# Patient Record
Sex: FEMALE
[De-identification: names, ages, dates, MRNs, and addresses within clinical notes are randomized per-mention and may not be internally consistent; named-entity substitution may affect disease eponyms.]

---

## 2018-12-26 ENCOUNTER — TRANSCRIPTION ENCOUNTER (OUTPATIENT)
Age: 48
End: 2018-12-26

## 2019-12-09 ENCOUNTER — TRANSCRIPTION ENCOUNTER (OUTPATIENT)
Age: 49
End: 2019-12-09

## 2019-12-28 ENCOUNTER — TRANSCRIPTION ENCOUNTER (OUTPATIENT)
Age: 49
End: 2019-12-28

## 2022-02-24 ENCOUNTER — FORM ENCOUNTER (OUTPATIENT)
Age: 52
End: 2022-02-24

## 2022-02-25 ENCOUNTER — OUTPATIENT (OUTPATIENT)
Dept: OUTPATIENT SERVICES | Facility: HOSPITAL | Age: 52
LOS: 1 days | End: 2022-02-25
Payer: COMMERCIAL

## 2022-02-25 ENCOUNTER — APPOINTMENT (OUTPATIENT)
Dept: GYNECOLOGIC ONCOLOGY | Facility: CLINIC | Age: 52
End: 2022-02-25
Payer: COMMERCIAL

## 2022-02-25 ENCOUNTER — NON-APPOINTMENT (OUTPATIENT)
Age: 52
End: 2022-02-25

## 2022-02-25 VITALS
WEIGHT: 225 LBS | RESPIRATION RATE: 16 BRPM | HEIGHT: 65 IN | BODY MASS INDEX: 37.49 KG/M2 | DIASTOLIC BLOOD PRESSURE: 89 MMHG | SYSTOLIC BLOOD PRESSURE: 120 MMHG | HEART RATE: 66 BPM | OXYGEN SATURATION: 96 %

## 2022-02-25 VITALS
SYSTOLIC BLOOD PRESSURE: 110 MMHG | OXYGEN SATURATION: 97 % | HEART RATE: 71 BPM | HEIGHT: 66 IN | WEIGHT: 228.62 LBS | TEMPERATURE: 98 F | DIASTOLIC BLOOD PRESSURE: 86 MMHG | RESPIRATION RATE: 20 BRPM

## 2022-02-25 DIAGNOSIS — Z01.818 ENCOUNTER FOR OTHER PREPROCEDURAL EXAMINATION: ICD-10-CM

## 2022-02-25 DIAGNOSIS — Z91.89 OTHER SPECIFIED PERSONAL RISK FACTORS, NOT ELSEWHERE CLASSIFIED: ICD-10-CM

## 2022-02-25 DIAGNOSIS — F17.200 NICOTINE DEPENDENCE, UNSPECIFIED, UNCOMPLICATED: ICD-10-CM

## 2022-02-25 DIAGNOSIS — R19.00 INTRA-ABDOMINAL AND PELVIC SWELLING, MASS AND LUMP, UNSPECIFIED SITE: ICD-10-CM

## 2022-02-25 DIAGNOSIS — Z78.9 OTHER SPECIFIED HEALTH STATUS: ICD-10-CM

## 2022-02-25 DIAGNOSIS — Z86.39 PERSONAL HISTORY OF OTHER ENDOCRINE, NUTRITIONAL AND METABOLIC DISEASE: ICD-10-CM

## 2022-02-25 DIAGNOSIS — R10.2 PELVIC AND PERINEAL PAIN: ICD-10-CM

## 2022-02-25 DIAGNOSIS — Z63.5 DISRUPTION OF FAMILY BY SEPARATION AND DIVORCE: ICD-10-CM

## 2022-02-25 LAB
A1C WITH ESTIMATED AVERAGE GLUCOSE RESULT: 6 % — HIGH (ref 4–5.6)
ALBUMIN SERPL ELPH-MCNC: 4.6 G/DL — SIGNIFICANT CHANGE UP (ref 3.3–5.2)
ALP SERPL-CCNC: 69 U/L — SIGNIFICANT CHANGE UP (ref 40–120)
ALT FLD-CCNC: 9 U/L — SIGNIFICANT CHANGE UP
ANION GAP SERPL CALC-SCNC: 14 MMOL/L — SIGNIFICANT CHANGE UP (ref 5–17)
APPEARANCE UR: CLEAR — SIGNIFICANT CHANGE UP
APTT BLD: 31.3 SEC — SIGNIFICANT CHANGE UP (ref 27.5–35.5)
AST SERPL-CCNC: 19 U/L — SIGNIFICANT CHANGE UP
BACTERIA # UR AUTO: ABNORMAL
BASOPHILS # BLD AUTO: 0.03 K/UL — SIGNIFICANT CHANGE UP (ref 0–0.2)
BASOPHILS NFR BLD AUTO: 0.4 % — SIGNIFICANT CHANGE UP (ref 0–2)
BILIRUB SERPL-MCNC: 0.7 MG/DL — SIGNIFICANT CHANGE UP (ref 0.4–2)
BILIRUB UR-MCNC: NEGATIVE — SIGNIFICANT CHANGE UP
BLD GP AB SCN SERPL QL: SIGNIFICANT CHANGE UP
BUN SERPL-MCNC: 10.3 MG/DL — SIGNIFICANT CHANGE UP (ref 8–20)
CALCIUM SERPL-MCNC: 9.4 MG/DL — SIGNIFICANT CHANGE UP (ref 8.6–10.2)
CHLORIDE SERPL-SCNC: 104 MMOL/L — SIGNIFICANT CHANGE UP (ref 98–107)
CO2 SERPL-SCNC: 22 MMOL/L — SIGNIFICANT CHANGE UP (ref 22–29)
COLOR SPEC: YELLOW — SIGNIFICANT CHANGE UP
CREAT SERPL-MCNC: 0.88 MG/DL — SIGNIFICANT CHANGE UP (ref 0.5–1.3)
DIFF PNL FLD: ABNORMAL
EOSINOPHIL # BLD AUTO: 0.2 K/UL — SIGNIFICANT CHANGE UP (ref 0–0.5)
EOSINOPHIL NFR BLD AUTO: 2.4 % — SIGNIFICANT CHANGE UP (ref 0–6)
EPI CELLS # UR: SIGNIFICANT CHANGE UP
ESTIMATED AVERAGE GLUCOSE: 126 MG/DL — HIGH (ref 68–114)
GLUCOSE SERPL-MCNC: 94 MG/DL — SIGNIFICANT CHANGE UP (ref 70–99)
GLUCOSE UR QL: NEGATIVE MG/DL — SIGNIFICANT CHANGE UP
HCG SERPL-ACNC: <4 MIU/ML — SIGNIFICANT CHANGE UP
HCT VFR BLD CALC: 41.4 % — SIGNIFICANT CHANGE UP (ref 34.5–45)
HGB BLD-MCNC: 13.5 G/DL — SIGNIFICANT CHANGE UP (ref 11.5–15.5)
IMM GRANULOCYTES NFR BLD AUTO: 0.2 % — SIGNIFICANT CHANGE UP (ref 0–1.5)
INR BLD: 1.03 RATIO — SIGNIFICANT CHANGE UP (ref 0.88–1.16)
KETONES UR-MCNC: NEGATIVE — SIGNIFICANT CHANGE UP
LEUKOCYTE ESTERASE UR-ACNC: NEGATIVE — SIGNIFICANT CHANGE UP
LYMPHOCYTES # BLD AUTO: 3.06 K/UL — SIGNIFICANT CHANGE UP (ref 1–3.3)
LYMPHOCYTES # BLD AUTO: 37.3 % — SIGNIFICANT CHANGE UP (ref 13–44)
MCHC RBC-ENTMCNC: 30.2 PG — SIGNIFICANT CHANGE UP (ref 27–34)
MCHC RBC-ENTMCNC: 32.6 GM/DL — SIGNIFICANT CHANGE UP (ref 32–36)
MCV RBC AUTO: 92.6 FL — SIGNIFICANT CHANGE UP (ref 80–100)
MONOCYTES # BLD AUTO: 0.41 K/UL — SIGNIFICANT CHANGE UP (ref 0–0.9)
MONOCYTES NFR BLD AUTO: 5 % — SIGNIFICANT CHANGE UP (ref 2–14)
NEUTROPHILS # BLD AUTO: 4.49 K/UL — SIGNIFICANT CHANGE UP (ref 1.8–7.4)
NEUTROPHILS NFR BLD AUTO: 54.7 % — SIGNIFICANT CHANGE UP (ref 43–77)
NITRITE UR-MCNC: NEGATIVE — SIGNIFICANT CHANGE UP
PH UR: 5 — SIGNIFICANT CHANGE UP (ref 5–8)
PLATELET # BLD AUTO: 187 K/UL — SIGNIFICANT CHANGE UP (ref 150–400)
POTASSIUM SERPL-MCNC: 4.3 MMOL/L — SIGNIFICANT CHANGE UP (ref 3.5–5.3)
POTASSIUM SERPL-SCNC: 4.3 MMOL/L — SIGNIFICANT CHANGE UP (ref 3.5–5.3)
PROT SERPL-MCNC: 7.3 G/DL — SIGNIFICANT CHANGE UP (ref 6.6–8.7)
PROT UR-MCNC: NEGATIVE — SIGNIFICANT CHANGE UP
PROTHROM AB SERPL-ACNC: 12 SEC — SIGNIFICANT CHANGE UP (ref 10.5–13.4)
RBC # BLD: 4.47 M/UL — SIGNIFICANT CHANGE UP (ref 3.8–5.2)
RBC # FLD: 13.8 % — SIGNIFICANT CHANGE UP (ref 10.3–14.5)
RBC CASTS # UR COMP ASSIST: SIGNIFICANT CHANGE UP /HPF (ref 0–4)
SARS-COV-2 RNA SPEC QL NAA+PROBE: SIGNIFICANT CHANGE UP
SODIUM SERPL-SCNC: 139 MMOL/L — SIGNIFICANT CHANGE UP (ref 135–145)
SP GR SPEC: 1.02 — SIGNIFICANT CHANGE UP (ref 1.01–1.02)
UROBILINOGEN FLD QL: NEGATIVE MG/DL — SIGNIFICANT CHANGE UP
WBC # BLD: 8.21 K/UL — SIGNIFICANT CHANGE UP (ref 3.8–10.5)
WBC # FLD AUTO: 8.21 K/UL — SIGNIFICANT CHANGE UP (ref 3.8–10.5)
WBC UR QL: SIGNIFICANT CHANGE UP /HPF (ref 0–5)

## 2022-02-25 PROCEDURE — 93005 ELECTROCARDIOGRAM TRACING: CPT

## 2022-02-25 PROCEDURE — 76857 US EXAM PELVIC LIMITED: CPT | Mod: 59

## 2022-02-25 PROCEDURE — 93010 ELECTROCARDIOGRAM REPORT: CPT

## 2022-02-25 PROCEDURE — 99204 OFFICE O/P NEW MOD 45 MIN: CPT | Mod: 25

## 2022-02-25 PROCEDURE — G0463: CPT

## 2022-02-25 PROCEDURE — 76830 TRANSVAGINAL US NON-OB: CPT | Mod: 59

## 2022-02-25 PROCEDURE — 93976 VASCULAR STUDY: CPT | Mod: 59

## 2022-02-25 RX ORDER — ATORVASTATIN CALCIUM 80 MG/1
TABLET, FILM COATED ORAL
Refills: 0 | Status: ACTIVE | COMMUNITY

## 2022-02-25 RX ORDER — CEFOTETAN DISODIUM 1 G
2 VIAL (EA) INJECTION ONCE
Refills: 0 | Status: DISCONTINUED | OUTPATIENT
Start: 2022-03-08 | End: 2022-03-10

## 2022-02-25 RX ORDER — METRONIDAZOLE 500 MG
500 TABLET ORAL ONCE
Refills: 0 | Status: DISCONTINUED | OUTPATIENT
Start: 2022-03-08 | End: 2022-03-10

## 2022-02-25 SDOH — SOCIAL STABILITY - SOCIAL INSECURITY: DISRUPTION OF FAMILY BY SEPARATION AND DIVORCE: Z63.5

## 2022-02-25 NOTE — H&P PST ADULT - HISTORY OF PRESENT ILLNESS
53 yo female with a history of abdominal pain which started monday , patient went to urgent care tues and did a CT scan and US. At that point she stated they found a mass. LMP 5 years ago   Patient went to see Dr. Rendon and was referred for surgical intervention   Patient having a TAHBSO  frozen section poss staging

## 2022-02-25 NOTE — H&P PST ADULT - PROBLEM SELECTOR PLAN 1
Patient went to see Dr. Rendon and was referred for surgical intervention   Patient having a TAHBSO  frozen section poss staging   Patient educated on surgical scrub, COVID testing, preadmission instructions, medical clearance and day of procedure medications, verbalizes understanding.   Patient having difficult time getting medical eval due to late surgical date

## 2022-02-25 NOTE — H&P PST ADULT - NSANTHOSAYNRD_GEN_A_CORE
No. MAHNAZ screening performed.  STOP BANG Legend: 0-2 = LOW Risk; 3-4 = INTERMEDIATE Risk; 5-8 = HIGH Risk

## 2022-02-26 LAB
CULTURE RESULTS: SIGNIFICANT CHANGE UP
SPECIMEN SOURCE: SIGNIFICANT CHANGE UP

## 2022-02-28 ENCOUNTER — TRANSCRIPTION ENCOUNTER (OUTPATIENT)
Age: 52
End: 2022-02-28

## 2022-03-01 NOTE — HISTORY OF PRESENT ILLNESS
[FreeTextEntry1] : 53 yo nulligravid female LMP 2017 referred by Dr Pineda for ovarian mass. Patient reports right sided abdominal/pelvic pain which started 2/21/22, she states it was originally a dull pain in the morning, which worsened throughout the day to the point of limiting her walking. She took 1000 mg Tylenol, which did not alleviate her pain. She slept on her L side and then when she woke up Tuesday morning her pain was worse and now 9/10 in severity and had radiated up to abdomen and umbilicus and was tender to palpation. She denies fever nausea/vomiting. She had presented to urgent care and had abdominal US and subsequent CT AP w/ w/o contrast 2/22/22 which revealed cystic mass extending through the pelvis to the level of the mid abdomen and measuring 15.1 x 11.0 x 20.1 cm, in the pelvic portion of the mass there are linear heterogeneous areas of enhancement and nodularity. Follow up US 2/24/22 with midline/right cystic pelvic mass measuring 20.6 x 10.9 x 11.7 cm, cluster of cystic areas seen within, blood flow noted within surrounding tissue. Today she feels pulsating pain which is not as severe and is not tender to palpation. \par \par Patient reports loss of 5 lbs since the holidays which she feels is abnormal as she has been eating unhealthy and should have gained weight, although she reports diminished appetite. \par \par Denies PMB, change in bowel/bladder habits. \par \par Lpap: 9/2021 WNL\par Timo; 7/2021\par Lcologuard WNL

## 2022-03-01 NOTE — END OF VISIT
[FreeTextEntry3] : Written by Estee Gonzalez PA-C, acting as a scribe for Dr. Hank Rendon.\par This note accurately reflects the work and decisions made by me.\par

## 2022-03-01 NOTE — CHIEF COMPLAINT
[FreeTextEntry1] : [facility]\par [place]\par [address]\[place] [address]\par

## 2022-03-01 NOTE — PHYSICAL EXAM
[Chaperone Present] : A chaperone was present in the examining room during all aspects of the physical examination [Abnormal] : Abdomen: Abnormal [Normal] : Bimanual Exam: Normal [FreeTextEntry1] : Estee Gonzalez PA-C [de-identified] : palpable mass extending up to abdomen [de-identified] : No CMT

## 2022-03-07 ENCOUNTER — FORM ENCOUNTER (OUTPATIENT)
Age: 52
End: 2022-03-07

## 2022-03-08 ENCOUNTER — TRANSCRIPTION ENCOUNTER (OUTPATIENT)
Age: 52
End: 2022-03-08

## 2022-03-08 ENCOUNTER — RESULT REVIEW (OUTPATIENT)
Age: 52
End: 2022-03-08

## 2022-03-08 ENCOUNTER — INPATIENT (INPATIENT)
Facility: HOSPITAL | Age: 52
LOS: 1 days | Discharge: HOME | DRG: 743 | End: 2022-03-10
Attending: OBSTETRICS & GYNECOLOGY | Admitting: OBSTETRICS & GYNECOLOGY
Payer: COMMERCIAL

## 2022-03-08 VITALS
WEIGHT: 228.62 LBS | SYSTOLIC BLOOD PRESSURE: 139 MMHG | TEMPERATURE: 100 F | OXYGEN SATURATION: 96 % | HEIGHT: 66 IN | HEART RATE: 79 BPM | DIASTOLIC BLOOD PRESSURE: 87 MMHG | RESPIRATION RATE: 16 BRPM

## 2022-03-08 DIAGNOSIS — R10.2 PELVIC AND PERINEAL PAIN: ICD-10-CM

## 2022-03-08 LAB
ABO RH CONFIRMATION: SIGNIFICANT CHANGE UP
GLUCOSE BLDC GLUCOMTR-MCNC: 104 MG/DL — HIGH (ref 70–99)
GLUCOSE BLDC GLUCOMTR-MCNC: 87 MG/DL — SIGNIFICANT CHANGE UP (ref 70–99)
GLUCOSE BLDC GLUCOMTR-MCNC: 91 MG/DL — SIGNIFICANT CHANGE UP (ref 70–99)

## 2022-03-08 PROCEDURE — 88112 CYTOPATH CELL ENHANCE TECH: CPT | Mod: 26

## 2022-03-08 PROCEDURE — 88305 TISSUE EXAM BY PATHOLOGIST: CPT | Mod: 26,59

## 2022-03-08 PROCEDURE — 88305 TISSUE EXAM BY PATHOLOGIST: CPT | Mod: 26

## 2022-03-08 PROCEDURE — 58150 TOTAL HYSTERECTOMY: CPT | Mod: 80

## 2022-03-08 PROCEDURE — 88307 TISSUE EXAM BY PATHOLOGIST: CPT | Mod: 26

## 2022-03-08 PROCEDURE — 88331 PATH CONSLTJ SURG 1 BLK 1SPC: CPT | Mod: 26

## 2022-03-08 PROCEDURE — 58150 TOTAL HYSTERECTOMY: CPT

## 2022-03-08 DEVICE — HEMOCLIP LG ORANGE 6 CARTRIDGE TITANIUM
Type: IMPLANTABLE DEVICE | Status: NON-FUNCTIONAL
Removed: 2022-03-08

## 2022-03-08 DEVICE — HEMOCLIP MED BLUE 6 CARTRIDGE TITANIUM
Type: IMPLANTABLE DEVICE | Status: NON-FUNCTIONAL
Removed: 2022-03-08

## 2022-03-08 RX ORDER — METOCLOPRAMIDE HCL 10 MG
10 TABLET ORAL EVERY 6 HOURS
Refills: 0 | Status: DISCONTINUED | OUTPATIENT
Start: 2022-03-08 | End: 2022-03-10

## 2022-03-08 RX ORDER — SODIUM CHLORIDE 9 MG/ML
3 INJECTION INTRAMUSCULAR; INTRAVENOUS; SUBCUTANEOUS EVERY 8 HOURS
Refills: 0 | Status: DISCONTINUED | OUTPATIENT
Start: 2022-03-08 | End: 2022-03-08

## 2022-03-08 RX ORDER — ACETAMINOPHEN 500 MG
975 TABLET ORAL ONCE
Refills: 0 | Status: COMPLETED | OUTPATIENT
Start: 2022-03-08 | End: 2022-03-08

## 2022-03-08 RX ORDER — ATORVASTATIN CALCIUM 80 MG/1
1 TABLET, FILM COATED ORAL
Qty: 0 | Refills: 0 | DISCHARGE

## 2022-03-08 RX ORDER — CEFAZOLIN SODIUM 1 G
2000 VIAL (EA) INJECTION ONCE
Refills: 0 | Status: COMPLETED | OUTPATIENT
Start: 2022-03-08 | End: 2022-03-08

## 2022-03-08 RX ORDER — ENOXAPARIN SODIUM 100 MG/ML
40 INJECTION SUBCUTANEOUS EVERY 24 HOURS
Refills: 0 | Status: DISCONTINUED | OUTPATIENT
Start: 2022-03-08 | End: 2022-03-10

## 2022-03-08 RX ORDER — HYDROMORPHONE HYDROCHLORIDE 2 MG/ML
30 INJECTION INTRAMUSCULAR; INTRAVENOUS; SUBCUTANEOUS
Refills: 0 | Status: DISCONTINUED | OUTPATIENT
Start: 2022-03-08 | End: 2022-03-09

## 2022-03-08 RX ORDER — CELECOXIB 200 MG/1
200 CAPSULE ORAL ONCE
Refills: 0 | Status: COMPLETED | OUTPATIENT
Start: 2022-03-08 | End: 2022-03-08

## 2022-03-08 RX ORDER — KETOROLAC TROMETHAMINE 30 MG/ML
30 SYRINGE (ML) INJECTION EVERY 6 HOURS
Refills: 0 | Status: DISCONTINUED | OUTPATIENT
Start: 2022-03-08 | End: 2022-03-09

## 2022-03-08 RX ORDER — NALOXONE HYDROCHLORIDE 4 MG/.1ML
0.1 SPRAY NASAL
Refills: 0 | Status: DISCONTINUED | OUTPATIENT
Start: 2022-03-08 | End: 2022-03-09

## 2022-03-08 RX ORDER — HYDROMORPHONE HYDROCHLORIDE 2 MG/ML
0.5 INJECTION INTRAMUSCULAR; INTRAVENOUS; SUBCUTANEOUS
Refills: 0 | Status: DISCONTINUED | OUTPATIENT
Start: 2022-03-08 | End: 2022-03-08

## 2022-03-08 RX ORDER — DEXTROSE MONOHYDRATE, SODIUM CHLORIDE, AND POTASSIUM CHLORIDE 50; .745; 4.5 G/1000ML; G/1000ML; G/1000ML
1000 INJECTION, SOLUTION INTRAVENOUS
Refills: 0 | Status: DISCONTINUED | OUTPATIENT
Start: 2022-03-08 | End: 2022-03-09

## 2022-03-08 RX ORDER — ONDANSETRON 8 MG/1
4 TABLET, FILM COATED ORAL EVERY 6 HOURS
Refills: 0 | Status: DISCONTINUED | OUTPATIENT
Start: 2022-03-08 | End: 2022-03-10

## 2022-03-08 RX ORDER — ACETAMINOPHEN 500 MG
975 TABLET ORAL EVERY 6 HOURS
Refills: 0 | Status: DISCONTINUED | OUTPATIENT
Start: 2022-03-08 | End: 2022-03-10

## 2022-03-08 RX ORDER — METRONIDAZOLE 500 MG
500 TABLET ORAL ONCE
Refills: 0 | Status: COMPLETED | OUTPATIENT
Start: 2022-03-08 | End: 2022-03-08

## 2022-03-08 RX ADMIN — ENOXAPARIN SODIUM 40 MILLIGRAM(S): 100 INJECTION SUBCUTANEOUS at 21:31

## 2022-03-08 RX ADMIN — Medication 100 MILLIGRAM(S): at 22:30

## 2022-03-08 RX ADMIN — Medication 30 MILLIGRAM(S): at 18:46

## 2022-03-08 RX ADMIN — DEXTROSE MONOHYDRATE, SODIUM CHLORIDE, AND POTASSIUM CHLORIDE 125 MILLILITER(S): 50; .745; 4.5 INJECTION, SOLUTION INTRAVENOUS at 21:31

## 2022-03-08 RX ADMIN — HYDROMORPHONE HYDROCHLORIDE 30 MILLILITER(S): 2 INJECTION INTRAMUSCULAR; INTRAVENOUS; SUBCUTANEOUS at 18:06

## 2022-03-08 RX ADMIN — ONDANSETRON 4 MILLIGRAM(S): 8 TABLET, FILM COATED ORAL at 17:00

## 2022-03-08 RX ADMIN — Medication 975 MILLIGRAM(S): at 21:31

## 2022-03-08 RX ADMIN — Medication 975 MILLIGRAM(S): at 11:38

## 2022-03-08 RX ADMIN — Medication 100 MILLIGRAM(S): at 21:31

## 2022-03-08 RX ADMIN — CELECOXIB 200 MILLIGRAM(S): 200 CAPSULE ORAL at 11:38

## 2022-03-08 RX ADMIN — HYDROMORPHONE HYDROCHLORIDE 30 MILLILITER(S): 2 INJECTION INTRAMUSCULAR; INTRAVENOUS; SUBCUTANEOUS at 16:42

## 2022-03-08 RX ADMIN — HYDROMORPHONE HYDROCHLORIDE 30 MILLILITER(S): 2 INJECTION INTRAMUSCULAR; INTRAVENOUS; SUBCUTANEOUS at 17:54

## 2022-03-08 NOTE — DISCHARGE NOTE PROVIDER - CARE PROVIDER_API CALL
Hank Rendon)  Gynecologic Oncology; Obstetrics and Gynecology  [address] [place] [address]  Phone: [phone]  Fax: [phone]  Follow Up Time:

## 2022-03-08 NOTE — BRIEF OPERATIVE NOTE - SPECIMENS
Right ovarian cystic mass, ovary, and fallopian tube for frozen section; pelvic washings for cytology; uterus, cervix,

## 2022-03-08 NOTE — ASU PREOP CHECKLIST - COMMENTS
"Chief Complaint   Patient presents with     Hypertension     Initial /80 (BP Location: Right arm, Patient Position: Chair, Cuff Size: Adult Large)  Pulse 68  Temp 98  F (36.7  C) (Oral)  Resp 16  Ht 6' 1\" (1.854 m)  Wt (!) 382 lb 6.4 oz (173.5 kg)  SpO2 98%  BMI 50.45 kg/m2 Estimated body mass index is 50.45 kg/(m^2) as calculated from the following:    Height as of this encounter: 6' 1\" (1.854 m).    Weight as of this encounter: 382 lb 6.4 oz (173.5 kg).  BP completed using cuff size large right arm.    Lisa Magill, CMA    " pt finished bowel prep 3/7/22   at 1600

## 2022-03-08 NOTE — DISCHARGE NOTE PROVIDER - HOSPITAL COURSE
53 y/o now POD#2 s/p CINDI BSO for large pelvic mass, uncomplicated intraoperative course. Patient was subsequently transferred to [facility] where she had an uncomplicated recovery. Post op labs, vital signs, and physical exams were within normal range and reassuring. At time of discharge pain was well controlled, she was ambulating and spontaneously voiding without difficulty, she was tolerating PO intake, and had no outstanding issues/concerns.

## 2022-03-08 NOTE — BRIEF OPERATIVE NOTE - OPERATION/FINDINGS
Enlarged right cystic mass, approximately 21cm. Sent for frozen section-- benign mucinous cyst.   Normal appearing appendix. Small atrophic uterus, normal appearing left fallopian tube and atrophic ovary. No observed extra ovarian disease.

## 2022-03-08 NOTE — ASU PREOP CHECKLIST - AS BP NONINV METHOD
electronic
Risks/benefits discussed with patient/surrogate/Vaccine Information Sheet (VIS) provided-VIS date: 8/15/19

## 2022-03-08 NOTE — DISCHARGE NOTE PROVIDER - NSDCFUADDAPPT_GEN_ALL_CORE_FT
A PA will call you in 1 week to check how you are feeling post operatively.   Follow-up with Dr. Rendon in two weeks to review pathology report.   Please follow-up with PA in one week for post-op visit/removal of sutures.  Follow-up with Dr. Rendon in two weeks to review pathology report.

## 2022-03-08 NOTE — CHART NOTE - NSCHARTNOTEFT_GEN_A_CORE
Pt seen at bedside postop  She complains of abdominal pain at her incision site  She also has some sleepiness when using her PCA  States she is "miserable" but that there is nothing that we can do to fix that  Per patient request told her that frozen section showed benign cyst and however this cannot confirm a dx and that Dr. Rendon would discuss pathology further with her in office  Dressing has some blood staining however is not saturated and is still dry    Vital Signs Last 24 Hrs  T(C): 37 (08 Mar 2022 22:00), Max: 37.5 (08 Mar 2022 11:20)  T(F): 98.6 (08 Mar 2022 22:00), Max: 99.5 (08 Mar 2022 11:20)  HR: 60 (08 Mar 2022 22:00) (60 - 79)  BP: 126/82 (08 Mar 2022 22:00) (126/82 - 161/98)  BP(mean): 98 (08 Mar 2022 17:15) (87 - 108)  RR: 18 (08 Mar 2022 22:00) (14 - 19)  SpO2: 98% (08 Mar 2022 22:00) (94% - 99%)    Stable postop from [facility] BSO  Added Reglan for nausea as pt states Zofran doesn't work for her  Recommended trying tylenol ontop of Toradol and PCA for pain Pt seen at bedside postop  She complains of abdominal pain at her incision site  She also has some sleepiness when using her PCA  States she is "miserable" but that there is nothing that we can do to fix that  Per patient request told her that frozen section showed benign cyst and however this cannot confirm a dx and that Dr. Rendon would discuss pathology further with her in office  Dressing has some blood staining however is not saturated and is still dry  Hoffman draining yellow urine    Vital Signs Last 24 Hrs  T(C): 37 (08 Mar 2022 22:00), Max: 37.5 (08 Mar 2022 11:20)  T(F): 98.6 (08 Mar 2022 22:00), Max: 99.5 (08 Mar 2022 11:20)  HR: 60 (08 Mar 2022 22:00) (60 - 79)  BP: 126/82 (08 Mar 2022 22:00) (126/82 - 161/98)  BP(mean): 98 (08 Mar 2022 17:15) (87 - 108)  RR: 18 (08 Mar 2022 22:00) (14 - 19)  SpO2: 98% (08 Mar 2022 22:00) (94% - 99%)    Stable postop from CINDI BSO  Added Reglan for nausea as pt states Zofran doesn't work for her  Recommended trying tylenol ontop of Toradol and PCA for pain

## 2022-03-08 NOTE — DISCHARGE NOTE PROVIDER - NSDCMRMEDTOKEN_GEN_ALL_CORE_FT
atorvastatin 10 mg oral tablet: 1 tab(s) orally once a day  naproxen 500 mg oral tablet: 1 tab(s) orally 2 times a day   oxycodone-acetaminophen 5 mg-325 mg oral tablet: 1 tab(s) orally every 6 hours, As Needed -for severe pain MDD:4 tablet   atorvastatin 10 mg oral tablet: 1 tab(s) orally once a day  naproxen 500 mg oral tablet: 1 tab(s) orally 2 times a day   ondansetron 8 mg oral tablet, disintegratin tab(s) orally every 6 hours   oxycodone-acetaminophen 5 mg-325 mg oral tablet: 1 tab(s) orally every 6 hours, As Needed -for severe pain MDD:4 tablet

## 2022-03-08 NOTE — DISCHARGE NOTE PROVIDER - NSDCFUADDINST_GEN_ALL_CORE_FT
Please contact your provider for any pain uncontrolled by medication, excessive bleeding or Fever>100.4  Please take aleve-1 tablet every 12 hours x 3 days, may take percocet as prescribed for breakthrough pain.    May walk and climb stairs as often as youd like, no vigorous activity, do not lift anything greater than 10lbs, nothing per vagina x 6 weeks, do not drive while on pain medication.

## 2022-03-09 LAB
ANION GAP SERPL CALC-SCNC: 10 MMOL/L — SIGNIFICANT CHANGE UP (ref 5–17)
BASOPHILS # BLD AUTO: 0 K/UL — SIGNIFICANT CHANGE UP (ref 0–0.2)
BASOPHILS NFR BLD AUTO: 0 % — SIGNIFICANT CHANGE UP (ref 0–2)
BUN SERPL-MCNC: 7.3 MG/DL — LOW (ref 8–20)
CALCIUM SERPL-MCNC: 8.3 MG/DL — LOW (ref 8.6–10.2)
CHLORIDE SERPL-SCNC: 105 MMOL/L — SIGNIFICANT CHANGE UP (ref 98–107)
CO2 SERPL-SCNC: 23 MMOL/L — SIGNIFICANT CHANGE UP (ref 22–29)
CREAT SERPL-MCNC: 0.75 MG/DL — SIGNIFICANT CHANGE UP (ref 0.5–1.3)
EGFR: 96 ML/MIN/1.73M2 — SIGNIFICANT CHANGE UP
EOSINOPHIL # BLD AUTO: 0 K/UL — SIGNIFICANT CHANGE UP (ref 0–0.5)
EOSINOPHIL NFR BLD AUTO: 0 % — SIGNIFICANT CHANGE UP (ref 0–6)
GLUCOSE SERPL-MCNC: 163 MG/DL — HIGH (ref 70–99)
HCT VFR BLD CALC: 37.9 % — SIGNIFICANT CHANGE UP (ref 34.5–45)
HGB BLD-MCNC: 12.3 G/DL — SIGNIFICANT CHANGE UP (ref 11.5–15.5)
IMM GRANULOCYTES NFR BLD AUTO: 0.3 % — SIGNIFICANT CHANGE UP (ref 0–1.5)
LYMPHOCYTES # BLD AUTO: 0.82 K/UL — LOW (ref 1–3.3)
LYMPHOCYTES # BLD AUTO: 10.4 % — LOW (ref 13–44)
MAGNESIUM SERPL-MCNC: 2.1 MG/DL — SIGNIFICANT CHANGE UP (ref 1.6–2.6)
MCHC RBC-ENTMCNC: 30.4 PG — SIGNIFICANT CHANGE UP (ref 27–34)
MCHC RBC-ENTMCNC: 32.5 GM/DL — SIGNIFICANT CHANGE UP (ref 32–36)
MCV RBC AUTO: 93.6 FL — SIGNIFICANT CHANGE UP (ref 80–100)
MONOCYTES # BLD AUTO: 0.52 K/UL — SIGNIFICANT CHANGE UP (ref 0–0.9)
MONOCYTES NFR BLD AUTO: 6.6 % — SIGNIFICANT CHANGE UP (ref 2–14)
NEUTROPHILS # BLD AUTO: 6.55 K/UL — SIGNIFICANT CHANGE UP (ref 1.8–7.4)
NEUTROPHILS NFR BLD AUTO: 82.7 % — HIGH (ref 43–77)
PHOSPHATE SERPL-MCNC: 2.7 MG/DL — SIGNIFICANT CHANGE UP (ref 2.4–4.7)
PLATELET # BLD AUTO: 175 K/UL — SIGNIFICANT CHANGE UP (ref 150–400)
POTASSIUM SERPL-MCNC: 4.6 MMOL/L — SIGNIFICANT CHANGE UP (ref 3.5–5.3)
POTASSIUM SERPL-SCNC: 4.6 MMOL/L — SIGNIFICANT CHANGE UP (ref 3.5–5.3)
RBC # BLD: 4.05 M/UL — SIGNIFICANT CHANGE UP (ref 3.8–5.2)
RBC # FLD: 13.8 % — SIGNIFICANT CHANGE UP (ref 10.3–14.5)
SODIUM SERPL-SCNC: 138 MMOL/L — SIGNIFICANT CHANGE UP (ref 135–145)
WBC # BLD: 7.91 K/UL — SIGNIFICANT CHANGE UP (ref 3.8–10.5)
WBC # FLD AUTO: 7.91 K/UL — SIGNIFICANT CHANGE UP (ref 3.8–10.5)

## 2022-03-09 RX ORDER — OXYCODONE HYDROCHLORIDE 5 MG/1
10 TABLET ORAL EVERY 4 HOURS
Refills: 0 | Status: DISCONTINUED | OUTPATIENT
Start: 2022-03-09 | End: 2022-03-10

## 2022-03-09 RX ORDER — OXYCODONE HYDROCHLORIDE 5 MG/1
5 TABLET ORAL EVERY 4 HOURS
Refills: 0 | Status: DISCONTINUED | OUTPATIENT
Start: 2022-03-09 | End: 2022-03-09

## 2022-03-09 RX ORDER — OXYCODONE HYDROCHLORIDE 5 MG/1
5 TABLET ORAL EVERY 4 HOURS
Refills: 0 | Status: DISCONTINUED | OUTPATIENT
Start: 2022-03-09 | End: 2022-03-10

## 2022-03-09 RX ORDER — SIMETHICONE 80 MG/1
80 TABLET, CHEWABLE ORAL EVERY 4 HOURS
Refills: 0 | Status: DISCONTINUED | OUTPATIENT
Start: 2022-03-09 | End: 2022-03-10

## 2022-03-09 RX ORDER — MAGNESIUM HYDROXIDE 400 MG/1
30 TABLET, CHEWABLE ORAL DAILY
Refills: 0 | Status: DISCONTINUED | OUTPATIENT
Start: 2022-03-09 | End: 2022-03-10

## 2022-03-09 RX ADMIN — Medication 30 MILLIGRAM(S): at 23:15

## 2022-03-09 RX ADMIN — Medication 30 MILLIGRAM(S): at 00:13

## 2022-03-09 RX ADMIN — Medication 30 MILLIGRAM(S): at 06:04

## 2022-03-09 RX ADMIN — Medication 30 MILLIGRAM(S): at 13:11

## 2022-03-09 RX ADMIN — Medication 975 MILLIGRAM(S): at 03:00

## 2022-03-09 RX ADMIN — Medication 975 MILLIGRAM(S): at 15:07

## 2022-03-09 RX ADMIN — Medication 975 MILLIGRAM(S): at 16:07

## 2022-03-09 RX ADMIN — Medication 30 MILLIGRAM(S): at 18:05

## 2022-03-09 RX ADMIN — Medication 975 MILLIGRAM(S): at 12:37

## 2022-03-09 RX ADMIN — SIMETHICONE 80 MILLIGRAM(S): 80 TABLET, CHEWABLE ORAL at 20:24

## 2022-03-09 RX ADMIN — Medication 30 MILLIGRAM(S): at 12:41

## 2022-03-09 RX ADMIN — ENOXAPARIN SODIUM 40 MILLIGRAM(S): 100 INJECTION SUBCUTANEOUS at 23:16

## 2022-03-09 RX ADMIN — Medication 975 MILLIGRAM(S): at 09:31

## 2022-03-09 RX ADMIN — Medication 975 MILLIGRAM(S): at 20:24

## 2022-03-09 NOTE — CHART NOTE - NSCHARTNOTEFT_GEN_A_CORE
Island dressing noted to be saturated after trip to restroom with gudelia-bottle   Incision clean dry and intact with staples, no active bleeding noted from the incision.   Abdominal pad placed over incision.

## 2022-03-09 NOTE — PROGRESS NOTE ADULT - ATTENDING COMMENTS
Patient is POD1 s/p CINDI BSO for large pelvic mass. Reports doing well this AM. Pain controlled; will transition from PCA to PO medications. Will dc ortiz and await first void. AM labs reviewed and WNL. Will continue inpatient management at this time. Discussed with Dr. Rendon.

## 2022-03-09 NOTE — PROGRESS NOTE ADULT - SUBJECTIVE AND OBJECTIVE BOX
GYNECOLOGIC ONCOLOGY PROGRESS NOTE    POD#1    PROBLEMS:  Organomegaly  At risk for venous thromboembolism (VTE)  Pelvic mass    SUBJECTIVE:  Patient is without major complaints.   Pain well-controlled on PCA pain regimen.   Flatus: not yet.   Tolerating PO intake, denies nausea/vomiting.   Hoffman removed 30 mins ago, no current urge to void.   Ambulated without major difficulty to bathroom.   Denies dizziness, lightheadedness, SOB, palpitations, or fatigue at rest or while ambulating.   Denies fevers, chills, malaise, fatigue, and myalgia.     OBJECTIVE:   VITALS:  T(F): 98.5 (03-09-22 @ 06:00), Max: 99.5 (03-08-22 @ 11:20)  HR: 60 (03-09-22 @ 06:00) (60 - 79)  BP: 113/77 (03-09-22 @ 06:00) (113/77 - 161/98)  RR: 16 (03-09-22 @ 06:00) (14 - 19)  SpO2: 97% (03-09-22 @ 06:00) (94% - 99%)    I&O's Summary  08 Mar 2022 07:01  -  09 Mar 2022 06:40  --------------------------------------------------------  IN: 1275 mL / OUT: 2361 mL / NET: -1086 mL    MEDICATIONS  (STANDING):  acetaminophen     Tablet .. 975 milliGRAM(s) Oral every 6 hours  cefoTEtan  IVPB 2 Gram(s) IV Intermittent once  dextrose 5% + sodium chloride 0.45% with potassium chloride 20 mEq/L 1000 milliLiter(s) (125 mL/Hr) IV Continuous <Continuous>  enoxaparin Injectable 40 milliGRAM(s) SubCutaneous every 24 hours  HYDROmorphone PCA (1 mG/mL) 30 milliLiter(s) PCA Continuous PCA Continuous  ketorolac   Injectable 30 milliGRAM(s) IV Push every 6 hours  metroNIDAZOLE  IVPB 500 milliGRAM(s) IV Intermittent once    MEDICATIONS  (PRN):  metoclopramide Injectable 10 milliGRAM(s) IV Push every 6 hours PRN nausea/vomiting  naloxone Injectable 0.1 milliGRAM(s) IV Push every 3 minutes PRN For ANY of the following changes in patient status:  A. RR LESS THAN 10 breaths per minute, B. Oxygen saturation LESS THAN 90%, C. Sedation score of 6  ondansetron Injectable 4 milliGRAM(s) IV Push every 6 hours PRN Nausea  ondansetron Injectable 4 milliGRAM(s) IV Push every 6 hours PRN Nausea and/or Vomiting    Physical Exam:  Constitutional: NAD  Pulmonary: clear to auscultation bilaterally   Cardiovascular: Regular rate and rhythm   Abdomen: soft, appropriately tender to palpation, non-distended, +bowel sounds  Extremities: no lower extremity edema or calve tenderness.  Incision: Clean, dry, intact; staples in place, island dressing in place     LABS:                        12.3   7.91  )-----------( 175      ( 09 Mar 2022 06:07 )             37.9     03-09    138  |  105  |  7.3<L>  ----------------------------<  163<H>  4.6   |  23.0  |  0.75    Ca    8.3<L>      09 Mar 2022 06:07  Phos  2.7     03-09  Mg     2.1     03-09     GYNECOLOGIC ONCOLOGY PROGRESS NOTE    POD#1    PROBLEMS:  Organomegaly  At risk for venous thromboembolism (VTE)  Pelvic mass    SUBJECTIVE:  Patient is without major complaints.   Pain well-controlled on PCA pain regimen.   Flatus: not yet.   Tolerating PO intake, denies nausea/vomiting.   Hoffman removed 30 mins ago, was able to void soon after.   Ambulated without major difficulty to bathroom.   Denies dizziness, lightheadedness, SOB, palpitations, or fatigue at rest or while ambulating.   Denies fevers, chills, malaise, fatigue, and myalgia.     OBJECTIVE:   VITALS:  T(F): 98.5 (03-09-22 @ 06:00), Max: 99.5 (03-08-22 @ 11:20)  HR: 60 (03-09-22 @ 06:00) (60 - 79)  BP: 113/77 (03-09-22 @ 06:00) (113/77 - 161/98)  RR: 16 (03-09-22 @ 06:00) (14 - 19)  SpO2: 97% (03-09-22 @ 06:00) (94% - 99%)    I&O's Summary  08 Mar 2022 07:01  -  09 Mar 2022 06:40  --------------------------------------------------------  IN: 1275 mL / OUT: 2361 mL / NET: -1086 mL    MEDICATIONS  (STANDING):  acetaminophen     Tablet .. 975 milliGRAM(s) Oral every 6 hours  cefoTEtan  IVPB 2 Gram(s) IV Intermittent once  dextrose 5% + sodium chloride 0.45% with potassium chloride 20 mEq/L 1000 milliLiter(s) (125 mL/Hr) IV Continuous <Continuous>  enoxaparin Injectable 40 milliGRAM(s) SubCutaneous every 24 hours  HYDROmorphone PCA (1 mG/mL) 30 milliLiter(s) PCA Continuous PCA Continuous  ketorolac   Injectable 30 milliGRAM(s) IV Push every 6 hours  metroNIDAZOLE  IVPB 500 milliGRAM(s) IV Intermittent once    MEDICATIONS  (PRN):  metoclopramide Injectable 10 milliGRAM(s) IV Push every 6 hours PRN nausea/vomiting  naloxone Injectable 0.1 milliGRAM(s) IV Push every 3 minutes PRN For ANY of the following changes in patient status:  A. RR LESS THAN 10 breaths per minute, B. Oxygen saturation LESS THAN 90%, C. Sedation score of 6  ondansetron Injectable 4 milliGRAM(s) IV Push every 6 hours PRN Nausea  ondansetron Injectable 4 milliGRAM(s) IV Push every 6 hours PRN Nausea and/or Vomiting    Physical Exam:  Constitutional: NAD  Pulmonary: clear to auscultation bilaterally   Cardiovascular: Regular rate and rhythm   Abdomen: soft, appropriately tender to palpation, non-distended, +bowel sounds  Extremities: no lower extremity edema or calve tenderness.  Incision: Clean, dry, intact; staples in place, island dressing in place     LABS:                        12.3   7.91  )-----------( 175      ( 09 Mar 2022 06:07 )             37.9     03-09    138  |  105  |  7.3<L>  ----------------------------<  163<H>  4.6   |  23.0  |  0.75    Ca    8.3<L>      09 Mar 2022 06:07  Phos  2.7     03-09  Mg     2.1     03-09     GYNECOLOGIC ONCOLOGY PROGRESS NOTE    POD#1    PROBLEMS:  Organomegaly  At risk for venous thromboembolism (VTE)  Pelvic mass    SUBJECTIVE:  Patient is without major complaints.   Pain well-controlled on PCA pain regimen.   Flatus: not yet.   Tolerating PO intake, denies nausea/vomiting.   Hoffman removed 30 mins ago, was able to void soon after.   Ambulated without major difficulty to bathroom.   Denies dizziness, lightheadedness, SOB, palpitations, or fatigue at rest or while ambulating.   Denies fevers, chills, malaise, fatigue, and myalgia.     OBJECTIVE:   VITALS:  T(F): 98.5 (03-09-22 @ 06:00), Max: 99.5 (03-08-22 @ 11:20)  HR: 60 (03-09-22 @ 06:00) (60 - 79)  BP: 113/77 (03-09-22 @ 06:00) (113/77 - 161/98)  RR: 16 (03-09-22 @ 06:00) (14 - 19)  SpO2: 97% (03-09-22 @ 06:00) (94% - 99%)    I&O's Summary  08 Mar 2022 07:01  -  09 Mar 2022 06:40  --------------------------------------------------------  IN: 1275 mL / OUT: 2361 mL / NET: -1086 mL    MEDICATIONS  (STANDING):  acetaminophen     Tablet .. 975 milliGRAM(s) Oral every 6 hours  cefoTEtan  IVPB 2 Gram(s) IV Intermittent once  dextrose 5% + sodium chloride 0.45% with potassium chloride 20 mEq/L 1000 milliLiter(s) (125 mL/Hr) IV Continuous <Continuous>  enoxaparin Injectable 40 milliGRAM(s) SubCutaneous every 24 hours  HYDROmorphone PCA (1 mG/mL) 30 milliLiter(s) PCA Continuous PCA Continuous  ketorolac   Injectable 30 milliGRAM(s) IV Push every 6 hours  metroNIDAZOLE  IVPB 500 milliGRAM(s) IV Intermittent once    MEDICATIONS  (PRN):  metoclopramide Injectable 10 milliGRAM(s) IV Push every 6 hours PRN nausea/vomiting  naloxone Injectable 0.1 milliGRAM(s) IV Push every 3 minutes PRN For ANY of the following changes in patient status:  A. RR LESS THAN 10 breaths per minute, B. Oxygen saturation LESS THAN 90%, C. Sedation score of 6  ondansetron Injectable 4 milliGRAM(s) IV Push every 6 hours PRN Nausea  ondansetron Injectable 4 milliGRAM(s) IV Push every 6 hours PRN Nausea and/or Vomiting    Physical Exam:  Constitutional: NAD  Pulmonary: clear to auscultation bilaterally   Cardiovascular: Regular rate and rhythm   Abdomen: soft, appropriately tender to palpation, non-distended, +bowel sounds  Extremities: no lower extremity edema or calve tenderness.  Incision: Clean, dry, intact; staples in place, island dressing in place -mildly saturated with dried blood.     LABS:                        12.3   7.91  )-----------( 175      ( 09 Mar 2022 06:07 )             37.9     03-09    138  |  105  |  7.3<L>  ----------------------------<  163<H>  4.6   |  23.0  |  0.75    Ca    8.3<L>      09 Mar 2022 06:07  Phos  2.7     03-09  Mg     2.1     03-09

## 2022-03-09 NOTE — CHART NOTE - NSCHARTNOTEFT_GEN_A_CORE
Pt seen and examined at bedside.   She reports excisional pain, has been avoiding narcotics due to fear of nausea  Pt is ambulating with out difficulty, voiding spontaneously and tolerating regular diet.       Vital Signs Last 24 Hrs  T(C): 36.8 (09 Mar 2022 07:50), Max: 37.2 (08 Mar 2022 18:00)  T(F): 98.3 (09 Mar 2022 07:50), Max: 98.9 (08 Mar 2022 18:00)  HR: 58 (09 Mar 2022 07:50) (58 - 75)  BP: 115/72 (09 Mar 2022 07:50) (113/77 - 161/98)  BP(mean): 98 (08 Mar 2022 17:15) (87 - 108)  RR: 16 (09 Mar 2022 07:50) (14 - 19)  SpO2: 96% (09 Mar 2022 07:50) (94% - 99%)    Incision c/d/i with staples.   Abdomen is soft and appropriately tender     plan:  continue routine postoperative care  Pt counselled on importance of optimized pain control in regard to quick recovery- will try oxycodone 5mg  ISS at bedside and encouraged   Forms for work leave completed   Will reassess in the am

## 2022-03-10 ENCOUNTER — TRANSCRIPTION ENCOUNTER (OUTPATIENT)
Age: 52
End: 2022-03-10

## 2022-03-10 VITALS
HEART RATE: 71 BPM | RESPIRATION RATE: 16 BRPM | DIASTOLIC BLOOD PRESSURE: 78 MMHG | SYSTOLIC BLOOD PRESSURE: 115 MMHG | TEMPERATURE: 98 F | OXYGEN SATURATION: 96 %

## 2022-03-10 LAB
ANION GAP SERPL CALC-SCNC: 12 MMOL/L — SIGNIFICANT CHANGE UP (ref 5–17)
BASOPHILS # BLD AUTO: 0.02 K/UL — SIGNIFICANT CHANGE UP (ref 0–0.2)
BASOPHILS NFR BLD AUTO: 0.3 % — SIGNIFICANT CHANGE UP (ref 0–2)
BUN SERPL-MCNC: 16.4 MG/DL — SIGNIFICANT CHANGE UP (ref 8–20)
CALCIUM SERPL-MCNC: 8.5 MG/DL — LOW (ref 8.6–10.2)
CHLORIDE SERPL-SCNC: 107 MMOL/L — SIGNIFICANT CHANGE UP (ref 98–107)
CO2 SERPL-SCNC: 24 MMOL/L — SIGNIFICANT CHANGE UP (ref 22–29)
CREAT SERPL-MCNC: 0.8 MG/DL — SIGNIFICANT CHANGE UP (ref 0.5–1.3)
EGFR: 89 ML/MIN/1.73M2 — SIGNIFICANT CHANGE UP
EOSINOPHIL # BLD AUTO: 0.12 K/UL — SIGNIFICANT CHANGE UP (ref 0–0.5)
EOSINOPHIL NFR BLD AUTO: 1.6 % — SIGNIFICANT CHANGE UP (ref 0–6)
GLUCOSE SERPL-MCNC: 110 MG/DL — HIGH (ref 70–99)
HCT VFR BLD CALC: 35.4 % — SIGNIFICANT CHANGE UP (ref 34.5–45)
HGB BLD-MCNC: 11.3 G/DL — LOW (ref 11.5–15.5)
IMM GRANULOCYTES NFR BLD AUTO: 0.3 % — SIGNIFICANT CHANGE UP (ref 0–1.5)
LYMPHOCYTES # BLD AUTO: 2.38 K/UL — SIGNIFICANT CHANGE UP (ref 1–3.3)
LYMPHOCYTES # BLD AUTO: 32.6 % — SIGNIFICANT CHANGE UP (ref 13–44)
MAGNESIUM SERPL-MCNC: 1.9 MG/DL — SIGNIFICANT CHANGE UP (ref 1.6–2.6)
MCHC RBC-ENTMCNC: 30.3 PG — SIGNIFICANT CHANGE UP (ref 27–34)
MCHC RBC-ENTMCNC: 31.9 GM/DL — LOW (ref 32–36)
MCV RBC AUTO: 94.9 FL — SIGNIFICANT CHANGE UP (ref 80–100)
MONOCYTES # BLD AUTO: 0.51 K/UL — SIGNIFICANT CHANGE UP (ref 0–0.9)
MONOCYTES NFR BLD AUTO: 7 % — SIGNIFICANT CHANGE UP (ref 2–14)
NEUTROPHILS # BLD AUTO: 4.25 K/UL — SIGNIFICANT CHANGE UP (ref 1.8–7.4)
NEUTROPHILS NFR BLD AUTO: 58.2 % — SIGNIFICANT CHANGE UP (ref 43–77)
PHOSPHATE SERPL-MCNC: 3 MG/DL — SIGNIFICANT CHANGE UP (ref 2.4–4.7)
PLATELET # BLD AUTO: 159 K/UL — SIGNIFICANT CHANGE UP (ref 150–400)
POTASSIUM SERPL-MCNC: 4.4 MMOL/L — SIGNIFICANT CHANGE UP (ref 3.5–5.3)
POTASSIUM SERPL-SCNC: 4.4 MMOL/L — SIGNIFICANT CHANGE UP (ref 3.5–5.3)
RBC # BLD: 3.73 M/UL — LOW (ref 3.8–5.2)
RBC # FLD: 14.4 % — SIGNIFICANT CHANGE UP (ref 10.3–14.5)
SODIUM SERPL-SCNC: 142 MMOL/L — SIGNIFICANT CHANGE UP (ref 135–145)
WBC # BLD: 7.3 K/UL — SIGNIFICANT CHANGE UP (ref 3.8–10.5)
WBC # FLD AUTO: 7.3 K/UL — SIGNIFICANT CHANGE UP (ref 3.8–10.5)

## 2022-03-10 PROCEDURE — 85025 COMPLETE CBC W/AUTO DIFF WBC: CPT

## 2022-03-10 PROCEDURE — 88112 CYTOPATH CELL ENHANCE TECH: CPT

## 2022-03-10 PROCEDURE — 88331 PATH CONSLTJ SURG 1 BLK 1SPC: CPT

## 2022-03-10 PROCEDURE — 80048 BASIC METABOLIC PNL TOTAL CA: CPT

## 2022-03-10 PROCEDURE — 82962 GLUCOSE BLOOD TEST: CPT

## 2022-03-10 PROCEDURE — 36415 COLL VENOUS BLD VENIPUNCTURE: CPT

## 2022-03-10 PROCEDURE — 83735 ASSAY OF MAGNESIUM: CPT

## 2022-03-10 PROCEDURE — 84100 ASSAY OF PHOSPHORUS: CPT

## 2022-03-10 PROCEDURE — 88305 TISSUE EXAM BY PATHOLOGIST: CPT

## 2022-03-10 PROCEDURE — 88307 TISSUE EXAM BY PATHOLOGIST: CPT

## 2022-03-10 RX ORDER — IBUPROFEN 200 MG
600 TABLET ORAL EVERY 6 HOURS
Refills: 0 | Status: DISCONTINUED | OUTPATIENT
Start: 2022-03-10 | End: 2022-03-10

## 2022-03-10 RX ORDER — ONDANSETRON 8 MG/1
1 TABLET, FILM COATED ORAL
Qty: 8 | Refills: 0
Start: 2022-03-10 | End: 2022-03-11

## 2022-03-10 RX ADMIN — Medication 975 MILLIGRAM(S): at 11:58

## 2022-03-10 RX ADMIN — Medication 975 MILLIGRAM(S): at 12:30

## 2022-03-10 RX ADMIN — OXYCODONE HYDROCHLORIDE 5 MILLIGRAM(S): 5 TABLET ORAL at 03:32

## 2022-03-10 RX ADMIN — ONDANSETRON 4 MILLIGRAM(S): 8 TABLET, FILM COATED ORAL at 03:50

## 2022-03-10 RX ADMIN — SIMETHICONE 80 MILLIGRAM(S): 80 TABLET, CHEWABLE ORAL at 11:57

## 2022-03-10 RX ADMIN — Medication 600 MILLIGRAM(S): at 10:08

## 2022-03-10 RX ADMIN — Medication 975 MILLIGRAM(S): at 03:24

## 2022-03-10 RX ADMIN — Medication 600 MILLIGRAM(S): at 11:00

## 2022-03-10 NOTE — DISCHARGE NOTE NURSING/CASE MANAGEMENT/SOCIAL WORK - PATIENT PORTAL LINK FT
You can access the FollowMyHealth Patient Portal offered by [facility] by registering at the following website: [email or web address]. By joining [employer]’s FollowMyHealth portal, you will also be able to view your health information using other applications (apps) compatible with our system.

## 2022-03-10 NOTE — DISCHARGE NOTE NURSING/CASE MANAGEMENT/SOCIAL WORK - NSDCPEFALRISK_GEN_ALL_CORE
For information on Fall & Injury Prevention, visit: [email or web address] OR  [email or web address] OR  https://www.cdc.gov/steadi/patient.html

## 2022-03-10 NOTE — PROGRESS NOTE ADULT - ASSESSMENT
51 y/o now POD#2 s/p CINDI BSO for large pelvic mass, EBL: 100.     Neuro: pain well controlled on PCA, will switch to PO regimen today   CV: HR and BP WNL, will continue to monitor   Pulm: o2 sats WNL on RA, no respiratory distress, incentive spirometry at bedside, encouraged continued use   GI/Nutrition: tolerating PO intake, continue   /Renal: Hoffman removed this AM, passed TOV.     ID: no active concerns   Heme: post operative Hgb: 12.3, no symptoms of anemia, no active concerns   Lines/Tubes: peripheral IV   Endo: no outstanding disease   Skin: incision sites healing well   Proph: SCD's when not ambulating, Lovenox ordered   Dispo: continue inpatient recovery/monitoring, will re-eval in PM  53 y/o now POD#2 s/p CINDI BSO for large pelvic mass, EBL: 100.     Neuro: pain well controlled on PO   CV: HR and BP WNL, will continue to monitor   Pulm: o2 sats WNL on RA, no respiratory distress, incentive spirometry at bedside, encouraged continued use   GI/Nutrition: tolerating PO intake, continue   /Renal: Hoffman removed this AM, passed TOV.     ID: no active concerns   Heme: post operative Hgb: 11.3, no symptoms of anemia, no active concerns   Lines/Tubes: peripheral IV   Endo: no outstanding disease   Skin: incision sites healing well   Proph: SCD's when not ambulating, Lovenox ordered   Dispo: continue inpatient recovery/monitoring, will re-eval in PM

## 2022-03-10 NOTE — DISCHARGE NOTE NURSING/CASE MANAGEMENT/SOCIAL WORK - NSDCFUADDAPPT_GEN_ALL_CORE_FT
Please follow-up with PA in one week for post-op visit/removal of sutures.  Follow-up with Dr. Rendon in two weeks to review pathology report.

## 2022-03-10 NOTE — PROGRESS NOTE ADULT - SUBJECTIVE AND OBJECTIVE BOX
GYNECOLOGIC ONCOLOGY PROGRESS NOTE    POD#2    PROBLEMS:  Organomegaly  At risk for venous thromboembolism (VTE)  Pelvic mass    SUBJECTIVE:  Patient is without major complaints.   Pain well-controlled on PCA pain regimen.   Flatus: not yet.   Tolerating PO intake, denies nausea/vomiting.   Hoffman removed 30 mins ago, was able to void soon after.   Ambulated without major difficulty to bathroom.   Denies dizziness, lightheadedness, SOB, palpitations, or fatigue at rest or while ambulating.   Denies fevers, chills, malaise, fatigue, and myalgia.     OBJECTIVE:   VITALS:  Vital Signs Last 24 Hrs  T(C): 36.4 (10 Mar 2022 03:34), Max: 36.8 (09 Mar 2022 07:50)  T(F): 97.6 (10 Mar 2022 03:34), Max: 98.3 (09 Mar 2022 07:50)  HR: 76 (10 Mar 2022 03:34) (58 - 76)  BP: 139/97 (10 Mar 2022 03:34) (115/72 - 139/97)  RR: 20 (10 Mar 2022 03:34) (16 - 20)  SpO2: 97% (10 Mar 2022 03:34) (94% - 97%)    Physical Exam:  Constitutional: NAD  Pulmonary: clear to auscultation bilaterally   Cardiovascular: Regular rate and rhythm   Abdomen: soft, appropriately tender to palpation, non-distended, +bowel sounds  Extremities: no lower extremity edema or calve tenderness.  Incision: Clean, dry, intact; staples in place, island dressing in place -mildly saturated with dried blood.     LABS:                        12.3   7.91  )-----------( 175      ( 09 Mar 2022 06:07 )             37.9     03-09    138  |  105  |  7.3<L>  ----------------------------<  163<H>  4.6   |  23.0  |  0.75    Ca    8.3<L>      09 Mar 2022 06:07  Phos  2.7     03-09  Mg     2.1     03-09     GYNECOLOGIC ONCOLOGY PROGRESS NOTE    POD#2    PROBLEMS:  Organomegaly  At risk for venous thromboembolism (VTE)  Pelvic mass    SUBJECTIVE:  Patient is without major complaints.   Pain well-controlled on PO pain regimen.   Flatus: yes   Tolerating PO intake, denies nausea/vomiting.   Voiding spontaneously.    Ambulated without major difficulty to bathroom.   Denies dizziness, lightheadedness, SOB, palpitations, or fatigue at rest or while ambulating.   Denies fevers, chills, malaise, fatigue, and myalgia.     OBJECTIVE:   VITALS:  Vital Signs Last 24 Hrs  T(C): 36.4 (10 Mar 2022 03:34), Max: 36.8 (09 Mar 2022 07:50)  T(F): 97.6 (10 Mar 2022 03:34), Max: 98.3 (09 Mar 2022 07:50)  HR: 76 (10 Mar 2022 03:34) (58 - 76)  BP: 139/97 (10 Mar 2022 03:34) (115/72 - 139/97)  RR: 20 (10 Mar 2022 03:34) (16 - 20)  SpO2: 97% (10 Mar 2022 03:34) (94% - 97%)    Physical Exam:  Constitutional: NAD  Pulmonary: clear to auscultation bilaterally   Cardiovascular: Regular rate and rhythm   Abdomen: soft, appropriately tender to palpation, non-distended, +bowel sounds  Extremities: no lower extremity edema or calve tenderness.  Incision: Clean, dry, intact; staples in place, island dressing in place -mildly saturated with dried blood.     LABS:                        11.3   7.30  )-----------( 159      ( 10 Mar 2022 06:33 )             35.4   03-09    138  |  105  |  7.3<L>  ----------------------------<  163<H>  4.6   |  23.0  |  0.75    Ca    8.3<L>      09 Mar 2022 06:07  Phos  2.7     03-09  Mg     2.1     03-09

## 2022-03-11 ENCOUNTER — NON-APPOINTMENT (OUTPATIENT)
Age: 52
End: 2022-03-11

## 2022-03-11 PROBLEM — D64.9 ANEMIA, UNSPECIFIED: Chronic | Status: ACTIVE | Noted: 2022-02-25

## 2022-03-11 LAB — NON-GYNECOLOGICAL CYTOLOGY STUDY: SIGNIFICANT CHANGE UP

## 2022-03-14 ENCOUNTER — APPOINTMENT (OUTPATIENT)
Dept: GYNECOLOGIC ONCOLOGY | Facility: CLINIC | Age: 52
End: 2022-03-14
Payer: COMMERCIAL

## 2022-03-14 VITALS — OXYGEN SATURATION: 96 % | HEART RATE: 75 BPM | SYSTOLIC BLOOD PRESSURE: 123 MMHG | DIASTOLIC BLOOD PRESSURE: 85 MMHG

## 2022-03-14 DIAGNOSIS — Z09 ENCOUNTER FOR FOLLOW-UP EXAMINATION AFTER COMPLETED TREATMENT FOR CONDITIONS OTHER THAN MALIGNANT NEOPLASM: ICD-10-CM

## 2022-03-14 PROCEDURE — 99214 OFFICE O/P EST MOD 30 MIN: CPT | Mod: 24

## 2022-03-14 NOTE — ASSESSMENT
[FreeTextEntry1] : 53yo s/p CINDI BSO 3/8/22 recuperating slowly with complaint of large amount of gas, bloating and nausea. Pt has been afebrile and without significant pain. She has been taking Zofran with some relief. Recommended she go to the ER for further evaluation, for hydration since she has barely ate. She may have some dehydration but pt refuses. Also offered for her to [facility] in 2 days for evaluation with Dr. Rendon. Pt states she lives very far and will continue to monitor herself. She agreed to come to the offiice should her symptoms persist. 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

## 2022-03-14 NOTE — REASON FOR VISIT
[Post Op] : post op visit [de-identified] : 3/8/22 [de-identified] : CINDI RAHMANO, PW [de-identified] : Pt is recuperating slowly. She complains of vomiting 3-4x 3 days ago. She called the office after 2 episodes of vomiting and was advised to go to the ER or office for evaluation but pt preferred to monitor herself. She feels she are a regular diet too quickly after surgery. She has only been drinking liquids for the past 3 days and has an odor aversion to food. She reports using an enema with some relief of her gas. She reports excessive burping. Had a small BM 3 days ago. She has been avoiding taking pain meds since not eating. well overall.  Denies fever, cp, sob, or calf pain. Denies VB. She is ambulating independently.  \par

## 2022-03-14 NOTE — DISCUSSION/SUMMARY
[Clean] : was clean [Dry] : was dry [Intact] : was intact [Staple Intact] : had staples intact [None] : had no drainage [Normal Skin] : normal appearance [Excellent Pain Control] : has excellent pain control [No Sign of Infection] : is showing no signs of infection [Slow Progress] : is progressing slowly [Remove Sutures/Staples] : remove sutures/staples [Clinical Recheck] : clinical recheck [Erythema] : was not erythematous [Ecchymosis] : was not ecchymotic [Seroma] : had no seroma [Firm] : soft [Abnormal Bowel Sounds] : normal bowel sounds [Mass] : no palpable mass [de-identified] : Removed every other staple

## 2022-03-18 LAB — SURGICAL PATHOLOGY STUDY: SIGNIFICANT CHANGE UP

## 2022-03-22 PROBLEM — R19.00 PELVIC MASS: Status: ACTIVE | Noted: 2022-02-25

## 2022-03-23 ENCOUNTER — APPOINTMENT (OUTPATIENT)
Dept: GYNECOLOGIC ONCOLOGY | Facility: CLINIC | Age: 52
End: 2022-03-23
Payer: COMMERCIAL

## 2022-03-23 VITALS
OXYGEN SATURATION: 97 % | DIASTOLIC BLOOD PRESSURE: 79 MMHG | RESPIRATION RATE: 16 BRPM | HEIGHT: 65 IN | WEIGHT: 225 LBS | HEART RATE: 70 BPM | BODY MASS INDEX: 37.49 KG/M2 | SYSTOLIC BLOOD PRESSURE: 114 MMHG

## 2022-03-23 DIAGNOSIS — R19.00 INTRA-ABDOMINAL AND PELVIC SWELLING, MASS AND LUMP, UNSPECIFIED SITE: ICD-10-CM

## 2022-03-23 PROCEDURE — 99024 POSTOP FOLLOW-UP VISIT: CPT

## 2022-03-28 NOTE — REASON FOR VISIT
[Post Op] : post op visit [de-identified] : 3/8/22 [de-identified] : Ex-Lap, CINDI BSO, PW, FS at [facility] for pelvic mass  [de-identified] : Patient has recovered well from her surgery, Denies any SOB, abnormal pain or VB. Bowel and bladder function are wnl. Patient states she feels well from a gynecological stand point.

## 2022-03-28 NOTE — DISCUSSION/SUMMARY
[Clean] : was clean [Dry] : was dry [Intact] : was intact [Normal Skin] : normal appearance [None] : had no drainage [Doing Well] : is doing well [Excellent Pain Control] : has excellent pain control [No Sign of Infection] : is showing no signs of infection [Findings] : These findings were discussed with [unfilled] in detail. She understood and accepted the rationale for this recommendation and also understood the serious impact that these findings could have upon her prognosis for survival. [Erythema] : was not erythematous [Ecchymosis] : was not ecchymotic [Seroma] : had no seroma [Firm] : soft [Tender] : nontender [Abnormal Bowel Sounds] : normal bowel sounds [Rebound] : no rebound tenderness [Guarding] : no guarding [Incisional Hernia] : no incisional hernia [Mass] : no palpable mass [External Genitalia Abnormal] : normal external genitalia [Vaginal Exam Abnormal] : normal vaginal exam [de-identified] : Kamila Laurent Medical assistant chaperoned during gynecologic exam.  [FreeTextEntry1] : Pertinent findings revealed 20 cm right ovarian neoplasm without surface involvement and benign impression from pathology. Normal uterus and tubes and left ovary. Normal upper abdomen. \par \par Final pathology reviewed.

## 2022-03-28 NOTE — END OF VISIT
[FreeTextEntry3] : Written by Kamila Laurent, acting as a scribe for Dr. Hank Rendon \par This note accurately reflects the work and decisions made by me.

## 2022-03-28 NOTE — ASSESSMENT
[FreeTextEntry1] : I discussed with patient that her final pathology revealed a borderline tumor, I explained that borderline tumors are not malignant however could recur as a malignancy. Patient will need to be followed in my office for routine surveillance visits with a Ca125 and Ca19-9 blood test prior to each visit. Patient stated she understood and agreed to comply.

## 2022-06-27 LAB
CANCER AG125 SERPL-ACNC: 7 U/ML
CANCER AG19-9 SERPL-ACNC: <2 U/ML

## 2022-07-12 ENCOUNTER — APPOINTMENT (OUTPATIENT)
Dept: GYNECOLOGIC ONCOLOGY | Facility: CLINIC | Age: 52
End: 2022-07-12

## 2022-07-12 VITALS
OXYGEN SATURATION: 98 % | SYSTOLIC BLOOD PRESSURE: 115 MMHG | HEART RATE: 69 BPM | BODY MASS INDEX: 37.49 KG/M2 | WEIGHT: 225 LBS | DIASTOLIC BLOOD PRESSURE: 78 MMHG | HEIGHT: 65 IN | RESPIRATION RATE: 16 BRPM

## 2022-07-12 DIAGNOSIS — R30.0 DYSURIA: ICD-10-CM

## 2022-07-12 PROCEDURE — 99214 OFFICE O/P EST MOD 30 MIN: CPT

## 2022-07-12 NOTE — DISCUSSION/SUMMARY
Warfarin.  Target INR between 2 and 3. The patient reports no bleeding.   [Reviewed Clinical Lab Test(s)] : Results of clinical tests were reviewed. [Reviewed Radiology Report(s)] : Radiology reports were reviewed.

## 2022-07-12 NOTE — ASSESSMENT
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 Pt with chronic right sided abdominal pain and dysuria. CTscan abd/pelvis and U/A with reflex to culture ordered for further evaluation. Discussed possible scar tissue, UTI, recurrent disease, hernia. \par \par Advised patient that Dr. Rendon [facility] [place] and explained what her follow up care should be moving forward for patient. Pt can see Dr. Rodriguez for any issues.\par

## 2022-07-12 NOTE — REASON FOR VISIT
[FreeTextEntry1] : [facility]\par \par [facility] [phone] at [address] [place] [id number]\par

## 2022-07-12 NOTE — PHYSICAL EXAM
[Chaperone Present] : A chaperone was present in the examining room during all aspects of the physical examination [Absent] : Adnexa(ae): Absent [Normal] : Anus and perineum: Normal sphincter tone, no masses, no prolapse. [FreeTextEntry1] : Meagan Mcclure MA [Overweight] : overweight [Abnormal] : Bimanual Exam: Abnormal [de-identified] : right mid abd and RLQ tenderness [de-identified] : fullness in RLQ, no discrete mass palpated

## 2022-07-13 ENCOUNTER — RESULT REVIEW (OUTPATIENT)
Age: 52
End: 2022-07-13

## 2022-07-13 NOTE — DISCHARGE NOTE PROVIDER - NSDCQMSTAIRS_GEN_ALL_CORE
No
Render In Strict Bullet Format?: No
Continue Regimen: Winlevi 1% Cream BID
Detail Level: Zone
Modify Regimen: Spironolactone 50mg BID x 2 weeks. Then TID only during premenstrual and postmentrual weeks.

## 2022-07-19 ENCOUNTER — APPOINTMENT (OUTPATIENT)
Dept: MAMMOGRAPHY | Facility: CLINIC | Age: 52
End: 2022-07-19

## 2022-07-19 ENCOUNTER — APPOINTMENT (OUTPATIENT)
Dept: CT IMAGING | Facility: CLINIC | Age: 52
End: 2022-07-19

## 2022-07-19 ENCOUNTER — RESULT REVIEW (OUTPATIENT)
Age: 52
End: 2022-07-19

## 2022-07-19 ENCOUNTER — OUTPATIENT (OUTPATIENT)
Dept: OUTPATIENT SERVICES | Facility: HOSPITAL | Age: 52
LOS: 1 days | End: 2022-07-19
Payer: COMMERCIAL

## 2022-07-19 DIAGNOSIS — Z00.00 ENCOUNTER FOR GENERAL ADULT MEDICAL EXAMINATION WITHOUT ABNORMAL FINDINGS: ICD-10-CM

## 2022-07-19 DIAGNOSIS — C56.9 MALIGNANT NEOPLASM OF UNSPECIFIED OVARY: ICD-10-CM

## 2022-07-19 DIAGNOSIS — Z12.31 ENCOUNTER FOR SCREENING MAMMOGRAM FOR MALIGNANT NEOPLASM OF BREAST: ICD-10-CM

## 2022-07-19 DIAGNOSIS — R10.9 UNSPECIFIED ABDOMINAL PAIN: ICD-10-CM

## 2022-07-19 PROCEDURE — 77067 SCR MAMMO BI INCL CAD: CPT

## 2022-07-19 PROCEDURE — 77067 SCR MAMMO BI INCL CAD: CPT | Mod: 26

## 2022-07-19 PROCEDURE — 77063 BREAST TOMOSYNTHESIS BI: CPT

## 2022-07-19 PROCEDURE — 74177 CT ABD & PELVIS W/CONTRAST: CPT | Mod: 26

## 2022-07-19 PROCEDURE — 77063 BREAST TOMOSYNTHESIS BI: CPT | Mod: 26

## 2022-07-19 PROCEDURE — 74177 CT ABD & PELVIS W/CONTRAST: CPT

## 2022-07-20 LAB
APPEARANCE: CLEAR
BILIRUBIN URINE: NEGATIVE
BLOOD URINE: NEGATIVE
COLOR: NORMAL
GLUCOSE QUALITATIVE U: NEGATIVE
KETONES URINE: NEGATIVE
LEUKOCYTE ESTERASE URINE: NEGATIVE
NITRITE URINE: NEGATIVE
PH URINE: 6
PROTEIN URINE: NEGATIVE
SPECIFIC GRAVITY URINE: 1.03
UROBILINOGEN URINE: NORMAL

## 2022-10-02 LAB
CANCER AG125 SERPL-ACNC: 9 U/ML
CANCER AG19-9 SERPL-ACNC: 3 U/ML

## 2022-10-09 ENCOUNTER — NON-APPOINTMENT (OUTPATIENT)
Age: 52
End: 2022-10-09

## 2022-10-11 ENCOUNTER — APPOINTMENT (OUTPATIENT)
Dept: GYNECOLOGIC ONCOLOGY | Facility: CLINIC | Age: 52
End: 2022-10-11

## 2022-10-11 VITALS
WEIGHT: 220 LBS | BODY MASS INDEX: 38.98 KG/M2 | OXYGEN SATURATION: 96 % | HEART RATE: 75 BPM | HEIGHT: 63 IN | SYSTOLIC BLOOD PRESSURE: 108 MMHG | DIASTOLIC BLOOD PRESSURE: 76 MMHG

## 2022-10-11 PROCEDURE — 99213 OFFICE O/P EST LOW 20 MIN: CPT

## 2022-10-11 NOTE — PHYSICAL EXAM
[Chaperone Present] : A chaperone was present in the examining room during all aspects of the physical examination [FreeTextEntry1] : Rand Brian MA was present as chaperone during exam today. [Absent] : Adnexa(ae): Absent [Normal] : Anus and perineum: Normal sphincter tone, no masses, no prolapse. [de-identified] : keloid scar mid abdomen  [Fully active, able to carry on all pre-disease performance without restriction] : Status 0 - Fully active, able to carry on all pre-disease performance without restriction

## 2022-10-11 NOTE — REASON FOR VISIT
[FreeTextEntry1] : [facility]\par \par [facility] [phone] at [address] [place] [id number]\par

## 2022-10-11 NOTE — ASSESSMENT
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  \par \par Pt with chronic RLQ pain possible scar tissue vs GI source. Stressed the need for GI workup and colonoscopy. Pt agreed to comply.  \par \par The signs and symptoms of recurrence were reviewed and the patient knows to return to see me sooner if she has any of these or any other concerns prior to her next visit.  Reasonable expectations for cure were also discussed.\par \par

## 2022-10-11 NOTE — HISTORY OF PRESENT ILLNESS
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 Pt complains of slight leakage when done with urination, feels like she has to continue emptying. She still complains of a chronic right sided lower abdominal pain, dull and achy since surgery. CT in July 2022 was TANIA. Denies N/V or fever. Denies VB or vag d/c. She has mild constipation issues well controlled with Metamucil. Pain has no associated factors. \par \par CTscan-7/19/22-TANIA, small fat containing umbilical hernia \par \par 10/1/22- =9, CA 19-9=3 \par \par Health maintenance:\par \par Mammo-7/19/22-BR  2, stable calcifications\par Colonoscopy-never, had neg cologuard \par DEXA-never\par

## 2023-01-03 LAB
CANCER AG125 SERPL-ACNC: 8 U/ML
CANCER AG19-9 SERPL-ACNC: 3 U/ML

## 2023-01-10 ENCOUNTER — APPOINTMENT (OUTPATIENT)
Dept: GYNECOLOGIC ONCOLOGY | Facility: CLINIC | Age: 53
End: 2023-01-10
Payer: COMMERCIAL

## 2023-01-31 ENCOUNTER — APPOINTMENT (OUTPATIENT)
Dept: GYNECOLOGIC ONCOLOGY | Facility: CLINIC | Age: 53
End: 2023-01-31
Payer: COMMERCIAL

## 2023-01-31 DIAGNOSIS — R10.9 UNSPECIFIED ABDOMINAL PAIN: ICD-10-CM

## 2023-01-31 PROCEDURE — 99214 OFFICE O/P EST MOD 30 MIN: CPT | Mod: 25

## 2023-01-31 NOTE — PROCEDURE
[Vaginal Pap] : vaginal pap smear [Other: ___] : [unfilled] [Patient] : the patient [Verbal Consent] : verbal consent was obtained prior to the procedure and is detailed in the patient's record

## 2023-01-31 NOTE — DISCUSSION/SUMMARY
[Reviewed Clinical Lab Test(s)] : Results of clinical tests were reviewed. [Reviewed Radiology Report(s)] : Radiology reports were reviewed.

## 2023-01-31 NOTE — ASSESSMENT
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 Stable tumor markers. \par \par Pt with chronic RLQ abd pain and new onset RUQ pain. Will obtain CTscan abd/pelvis and Abd US for further evaluation of possible gallstones, recurrence. Pt is very nervous about a recurrence. \par \par \par \par

## 2023-01-31 NOTE — REASON FOR VISIT
[FreeTextEntry1] : [facility]\par \par [facility] [phone] at [address] [place] [address]\par

## 2023-01-31 NOTE — PHYSICAL EXAM
[Chaperone Present] : A chaperone was present in the examining room during all aspects of the physical examination [FreeTextEntry1] : Rand Brian MA was present as chaperone during exam today. [Obese] : obese [Abnormal] : Abdomen: Abnormal [Absent] : Adnexa(ae): Absent [Normal] : Anus and perineum: Normal sphincter tone, no masses, no prolapse. [de-identified] : RUQ tenderness on deep palpation, RLQ mild tenderness  [Fully active, able to carry on all pre-disease performance without restriction] : Status 0 - Fully active, able to carry on all pre-disease performance without restriction

## 2023-01-31 NOTE — HISTORY OF PRESENT ILLNESS
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 Pt complains of slight leakage when done with urination, feels like she has to continue emptying. She is not interested in Uro Gyn consult. She still complains of a chronic right sided lower abdominal pain, dull and achy since surgery. She also has new onset RUQ abd pain for the past month described as "pressure against her ribs". Denies n/v, SOB or appetite changes. Pain is always present with no associated factors. She also admits to bloating and feels her belly is bigger. Denies bowel changes. CT in July 2022 was TANIA. Denies VB or vag d/c. She has mild constipation issues well controlled with Metamucil. \par \par CTscan-7/19/22-TANIA, small fat containing umbilical hernia \par \par 12/31/22- =8. CA 19-9=3 \par \par Health maintenance:\par \par PAP-2021-wnl, denies h/o abn paps \par Mammo-7/19/22-BR 2, stable calcifications\par Colonoscopy-Jan 2023-neg as per pt, no polyps, mild diverticulosis and due again in 10 yrs (Dr. Tavares)\par DEXA-never\par

## 2023-02-01 LAB — HPV HIGH+LOW RISK DNA PNL CVX: NOT DETECTED

## 2023-02-03 ENCOUNTER — APPOINTMENT (OUTPATIENT)
Dept: ULTRASOUND IMAGING | Facility: CLINIC | Age: 53
End: 2023-02-03
Payer: SELF-PAY

## 2023-02-03 ENCOUNTER — RESULT REVIEW (OUTPATIENT)
Age: 53
End: 2023-02-03

## 2023-02-03 ENCOUNTER — OUTPATIENT (OUTPATIENT)
Dept: OUTPATIENT SERVICES | Facility: HOSPITAL | Age: 53
LOS: 1 days | End: 2023-02-03
Payer: COMMERCIAL

## 2023-02-03 DIAGNOSIS — R10.9 UNSPECIFIED ABDOMINAL PAIN: ICD-10-CM

## 2023-02-03 PROCEDURE — 76700 US EXAM ABDOM COMPLETE: CPT | Mod: 26

## 2023-02-03 PROCEDURE — 76700 US EXAM ABDOM COMPLETE: CPT

## 2023-02-04 ENCOUNTER — APPOINTMENT (OUTPATIENT)
Dept: CT IMAGING | Facility: CLINIC | Age: 53
End: 2023-02-04
Payer: SELF-PAY

## 2023-02-04 ENCOUNTER — OUTPATIENT (OUTPATIENT)
Dept: OUTPATIENT SERVICES | Facility: HOSPITAL | Age: 53
LOS: 1 days | End: 2023-02-04
Payer: SELF-PAY

## 2023-02-04 DIAGNOSIS — C56.9 MALIGNANT NEOPLASM OF UNSPECIFIED OVARY: ICD-10-CM

## 2023-02-04 PROCEDURE — 74177 CT ABD & PELVIS W/CONTRAST: CPT | Mod: 26

## 2023-02-04 PROCEDURE — 74177 CT ABD & PELVIS W/CONTRAST: CPT

## 2023-02-06 LAB — CYTOLOGY CVX/VAG DOC THIN PREP: NORMAL

## 2023-04-24 LAB
CANCER AG125 SERPL-ACNC: 9 U/ML
CANCER AG19-9 SERPL-ACNC: 2 U/ML

## 2023-04-26 ENCOUNTER — APPOINTMENT (OUTPATIENT)
Dept: GYNECOLOGIC ONCOLOGY | Facility: CLINIC | Age: 53
End: 2023-04-26
Payer: COMMERCIAL

## 2023-04-26 VITALS
HEART RATE: 76 BPM | WEIGHT: 224 LBS | BODY MASS INDEX: 39.69 KG/M2 | HEIGHT: 63 IN | SYSTOLIC BLOOD PRESSURE: 113 MMHG | DIASTOLIC BLOOD PRESSURE: 80 MMHG | OXYGEN SATURATION: 95 %

## 2023-04-26 PROCEDURE — 99213 OFFICE O/P EST LOW 20 MIN: CPT

## 2023-04-26 NOTE — HISTORY OF PRESENT ILLNESS
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  Denies any SOB, abnormal pain or VB. Bowel and bladder function are wnl. Patient states she feels well from a gynecological stand point. \par \par At her last visit with one of my PA's pt reported chronic RLQ abd pain and new onset RUQ pain. Obtained  CTscan abd/pelvis and Abd US for further evaluation of possible gallstones, recurrence.\par \par US abdomen 02/01/2023: IMPRESSION:\par Normal abdominal ultrasound.\par No finding to explain the patient's pain\par \par CT A/P 02/04/2023: IMPRESSION: No evidence of acute inflammatory or obstructive process in the abdomen and pelvis.TANIA, pt was advised she can see GI if abd pains persist by a PA.\par \par  04/22/2023: 9 LAST ONE WAS 8\par CA 19-9 04/22/2023: 2 LAST ONE WAS 2\par \par Interval History: the patient reports not being sexually active. She reports having pelvic pain since surgery and it has not improved. She also reports occasional incontinence, small amount. \par \par \par Health maintenance:\par \par PAP-2021-wnl, denies h/o abn paps \par Mammo-7/19/22-BR 2, stable calcifications\par Colonoscopy-Jan 2023-neg as per pt, no polyps, mild diverticulosis and due again in 10 yrs (Dr. Tavares)\par DEXA-never\par \par

## 2023-04-26 NOTE — ASSESSMENT
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  Exam painful per patient consistent with pelvic floor dysfunction. Will recommend evaluation for pelvic floor physical therapy. The patient reports not being sexually active and has not even tried after surgery. We discussed that low estrogen could be contributing but overall hysterectomy does not lead to pelvic floor dysfunction. She also reports pain with weather changes which are consistent with musculoskeletal changes.

## 2023-04-26 NOTE — PHYSICAL EXAM
[Chaperone Present] : A chaperone was present in the examining room during all aspects of the physical examination [FreeTextEntry1] : Rand Brian MA was present the entire duration of the patient interaction and gynecological exam. [Absent] : Adnexa(ae): Absent [Normal] : Anus and perineum: Normal sphincter tone, no masses, no prolapse. [Restricted in physically strenuous activity but ambulatory and able to carry out work of a light or sedentary nature] : Status 1- Restricted in physically strenuous activity but ambulatory and able to carry out work of a light or sedentary nature, e.g., light house work, office work

## 2023-04-26 NOTE — END OF VISIT
[FreeTextEntry3] : Pelvic floor physical therapy\par RTC in 3 months for surveillance [FreeTextEntry2] : Rand Brian MA was present the entire duration of the patient interaction and gynecological exam.\par

## 2023-04-26 NOTE — REASON FOR VISIT
[FreeTextEntry1] : [place] Location \par [facility] \par  \par [address] [facility] \par [facility], [id number] \par [phone]\par

## 2023-05-01 ENCOUNTER — APPOINTMENT (OUTPATIENT)
Dept: GYNECOLOGIC ONCOLOGY | Facility: CLINIC | Age: 53
End: 2023-05-01

## 2023-05-24 ENCOUNTER — APPOINTMENT (OUTPATIENT)
Dept: GYNECOLOGIC ONCOLOGY | Facility: CLINIC | Age: 53
End: 2023-05-24

## 2023-07-31 LAB
CANCER AG125 SERPL-ACNC: 7 U/ML
CANCER AG19-9 SERPL-ACNC: 3 U/ML

## 2023-08-10 ENCOUNTER — APPOINTMENT (OUTPATIENT)
Dept: GYNECOLOGIC ONCOLOGY | Facility: CLINIC | Age: 53
End: 2023-08-10
Payer: COMMERCIAL

## 2023-08-10 VITALS — HEIGHT: 63 IN | BODY MASS INDEX: 38.62 KG/M2 | WEIGHT: 218 LBS

## 2023-08-10 PROCEDURE — 99212 OFFICE O/P EST SF 10 MIN: CPT

## 2023-08-10 NOTE — PHYSICAL EXAM
[Absent] : Adnexa(ae): Absent [Normal] : Anus and perineum: Normal sphincter tone, no masses, no prolapse. [Restricted in physically strenuous activity but ambulatory and able to carry out work of a light or sedentary nature] : Status 1- Restricted in physically strenuous activity but ambulatory and able to carry out work of a light or sedentary nature, e.g., light house work, office work [FreeTextEntry1] : Iris Christie MA was present the entire time of gynecological exam.  [de-identified] : no discharge [de-identified] : no masses, no nodularity

## 2023-08-10 NOTE — REASON FOR VISIT
[FreeTextEntry1] : [facility] at [address] [facility] [address], [place] [address]; [phone]

## 2023-08-10 NOTE — END OF VISIT
[FreeTextEntry3] : RTC in 3-4 months with pre-clinic markers [FreeTextEntry2] : Valeri Christie MA was present the entire duration of the patient interaction and gynecological exam.

## 2023-08-10 NOTE — ASSESSMENT
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 Continues pelvic floor rehabilitation therapy for pelvic floor dysfunction. Continues to have pain on exam but declines additional evaluation. We discussed that there is no reason to think this is related to recurrence and likely just related to pelvic floor dysfunction. Pain was reproduced on exam today again. She can live with current level of discomfort.

## 2023-08-10 NOTE — HISTORY OF PRESENT ILLNESS
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 Denies any SOB, abnormal pain or VB. Bowel and bladder function are wnl. Patient states she feels well from a gynecological standpoint.   At her last visit with one of my PA's pt reported chronic RLQ abd pain and new onset RUQ pain. Obtained CT scan abd/pelvis and Abd US for further evaluation of possible gallstones, recurrence. Both studies did not indicate specific etiologies of the patient's pain.  At her last visit, patient stated that the exam was painful which is consistent with pelvic floor dysfunction. I referred her to pelvic floor physical therapy.  She continues therapy.  CT A/P 02/04/2023: IMPRESSION: No evidence of acute inflammatory or obstructive process in the abdomen and pelvis.TANIA, pt was advised she can see GI if abd pains persist by a PA.  Interval History: The patient reports no new gynecologic issues.    (07/29/2023): 7 <-- 9 (4/22/23)  CA 19-9 (07/29/2023): 3 <-- 2 (4/22/23)  Health maintenance: PAP-2021-wnl, denies h/o abn paps  Mammo-7/19/22-BR 2, stable calcifications  Colonoscopy-Jan 2023-neg as per pt, no polyps, mild diverticulosis and due again in 10 yrs (Dr. Tavares)  DEXA-never

## 2023-10-16 NOTE — ASU PREOP CHECKLIST - AS BP NONINV SITE
right upper arm Orbicularis Oris Muscle Flap Text: The defect edges were debeveled with a #15 scalpel blade.  Given that the defect affected the competency of the oral sphincter an orbicularis oris muscle flap was deemed most appropriate to restore this competency and normal muscle function.  Using a sterile surgical marker, an appropriate flap was drawn incorporating the defect. The area thus outlined was incised with a #15 scalpel blade. Following this, the designed flap was carried over into the primary defect and sutured into place.

## 2023-12-04 LAB
CANCER AG125 SERPL-ACNC: 8 U/ML
CANCER AG19-9 SERPL-ACNC: 2 U/ML

## 2023-12-14 ENCOUNTER — APPOINTMENT (OUTPATIENT)
Dept: GYNECOLOGIC ONCOLOGY | Facility: CLINIC | Age: 53
End: 2023-12-14
Payer: COMMERCIAL

## 2023-12-14 VITALS
DIASTOLIC BLOOD PRESSURE: 78 MMHG | RESPIRATION RATE: 16 BRPM | HEIGHT: 63 IN | BODY MASS INDEX: 37.92 KG/M2 | SYSTOLIC BLOOD PRESSURE: 126 MMHG | WEIGHT: 214 LBS

## 2023-12-14 DIAGNOSIS — M62.89 OTHER SPECIFIED DISORDERS OF MUSCLE: ICD-10-CM

## 2023-12-14 PROCEDURE — 99213 OFFICE O/P EST LOW 20 MIN: CPT

## 2023-12-14 NOTE — PHYSICAL EXAM
[Chaperone Present] : A chaperone was present in the examining room during all aspects of the physical examination [FreeTextEntry1] : Carolin Cochran MA was present the entire duration of the patient interaction and gynecological exam. [Absent] : Adnexa(ae): Absent [Normal] : Anus and perineum: Normal sphincter tone, no masses, no prolapse. [de-identified] : no discharge, no lesions [de-identified] : + pain on palpation of pelvic floor and anterior vagina, limited exam, no masses or nodularity [Restricted in physically strenuous activity but ambulatory and able to carry out work of a light or sedentary nature] : Status 1- Restricted in physically strenuous activity but ambulatory and able to carry out work of a light or sedentary nature, e.g., light house work, office work

## 2023-12-14 NOTE — REASON FOR VISIT
[FreeTextEntry1] : [facility] [place]. [phone]

## 2023-12-14 NOTE — ASSESSMENT
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 Stopped pelvic floor rehabilitation therapy for pelvic floor dysfunction. Additional interventions declined or consultation with urogynecology as patient reports she is able to deal with it without major impact on her quality of life.   No evidence of recurrence. Continue to monitor ,  and repeat in 6 months.

## 2023-12-14 NOTE — END OF VISIT
[FreeTextEntry3] : RTC in 6 months or sooner with new concerns ,   [FreeTextEntry2] : Carolin Cochran MA was present the entire duration of the patient interaction and gynecological exam.

## 2023-12-14 NOTE — HISTORY OF PRESENT ILLNESS
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 Stopped pelvic floor rehabilitation therapy for pelvic floor dysfunction in August and reports no improvement only temporary. Continues to have pain on exam but declines additional evaluation. We discussed that there is no reason to think this is related to recurrence and likely just related to pelvic floor dysfunction. Pain was reproduced on exam again at last visit. She reported she can live with current level of discomfort.  12/2/23:  Ca125: 8 Ca19-9: 2

## 2024-06-03 LAB
CANCER AG125 SERPL-ACNC: 7 U/ML
CANCER AG19-9 SERPL-ACNC: 3 U/ML

## 2024-06-13 ENCOUNTER — APPOINTMENT (OUTPATIENT)
Dept: GYNECOLOGIC ONCOLOGY | Facility: CLINIC | Age: 54
End: 2024-06-13
Payer: COMMERCIAL

## 2024-06-13 VITALS
SYSTOLIC BLOOD PRESSURE: 128 MMHG | BODY MASS INDEX: 32.99 KG/M2 | WEIGHT: 198 LBS | DIASTOLIC BLOOD PRESSURE: 72 MMHG | HEIGHT: 65 IN

## 2024-06-13 DIAGNOSIS — Z00.00 ENCOUNTER FOR GENERAL ADULT MEDICAL EXAMINATION W/OUT ABNORMAL FINDINGS: ICD-10-CM

## 2024-06-13 DIAGNOSIS — C56.9 MALIGNANT NEOPLASM OF UNSPECIFIED OVARY: ICD-10-CM

## 2024-06-13 PROCEDURE — 99214 OFFICE O/P EST MOD 30 MIN: CPT

## 2024-06-13 PROCEDURE — G2211 COMPLEX E/M VISIT ADD ON: CPT

## 2024-06-13 NOTE — REASON FOR VISIT
[FreeTextEntry1] : ABBY VASQUEZ is being seen for routine SVL visit for right ovarian mucinous cystadenoma, borderline malignancy

## 2024-06-13 NOTE — END OF VISIT
[FreeTextEntry3] : RTC in 6 months or sooner pending CT scan results CT abdomen/pelvis  , , CEA in 6 months  [FreeTextEntry2] :  VIMAL Morris was present the entire duration of the patient interaction and gynecological exam.

## 2024-06-13 NOTE — ASSESSMENT
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 Normal tumor markers. Pelvic pain with limited exam. It has been 2 years since surgery will get CT scan abdomen/pelvis given pelvic pain and limited exam.   Mammogram ordered.

## 2024-06-13 NOTE — HISTORY OF PRESENT ILLNESS
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 Additional interventions declined or consultation with urogynecology as patient reports she is able to deal with it without major impact on her quality of life.  No evidence of recurrence. POC: continue to monitor ,  and repeat in 6 months.  Interval History   (6/1/24): 7 CA 19-9 (6/1/24): 3  Health maintenance Mammogram (07/2023): pt stated normal    Patient here today in office for 6 month SVL visit

## 2024-07-02 ENCOUNTER — OUTPATIENT (OUTPATIENT)
Dept: OUTPATIENT SERVICES | Facility: HOSPITAL | Age: 54
LOS: 1 days | End: 2024-07-02
Payer: COMMERCIAL

## 2024-07-02 ENCOUNTER — APPOINTMENT (OUTPATIENT)
Dept: CT IMAGING | Facility: CLINIC | Age: 54
End: 2024-07-02
Payer: COMMERCIAL

## 2024-07-02 DIAGNOSIS — C56.9 MALIGNANT NEOPLASM OF UNSPECIFIED OVARY: ICD-10-CM

## 2024-07-02 PROCEDURE — 74160 CT ABDOMEN W/CONTRAST: CPT | Mod: 26

## 2024-07-02 PROCEDURE — 74160 CT ABDOMEN W/CONTRAST: CPT

## 2024-07-03 ENCOUNTER — RESULT REVIEW (OUTPATIENT)
Age: 54
End: 2024-07-03

## 2024-07-03 ENCOUNTER — APPOINTMENT (OUTPATIENT)
Dept: MAMMOGRAPHY | Facility: CLINIC | Age: 54
End: 2024-07-03
Payer: COMMERCIAL

## 2024-07-03 ENCOUNTER — OUTPATIENT (OUTPATIENT)
Dept: OUTPATIENT SERVICES | Facility: HOSPITAL | Age: 54
LOS: 1 days | End: 2024-07-03
Payer: COMMERCIAL

## 2024-07-03 DIAGNOSIS — Z00.8 ENCOUNTER FOR OTHER GENERAL EXAMINATION: ICD-10-CM

## 2024-07-03 PROCEDURE — 77067 SCR MAMMO BI INCL CAD: CPT | Mod: 26

## 2024-07-03 PROCEDURE — 77063 BREAST TOMOSYNTHESIS BI: CPT | Mod: 26

## 2024-07-03 PROCEDURE — 77063 BREAST TOMOSYNTHESIS BI: CPT

## 2024-07-03 PROCEDURE — 77067 SCR MAMMO BI INCL CAD: CPT

## 2024-12-12 ENCOUNTER — APPOINTMENT (OUTPATIENT)
Dept: GYNECOLOGIC ONCOLOGY | Facility: CLINIC | Age: 54
End: 2024-12-12
Payer: COMMERCIAL

## 2024-12-12 VITALS
WEIGHT: 198 LBS | DIASTOLIC BLOOD PRESSURE: 74 MMHG | HEIGHT: 65 IN | SYSTOLIC BLOOD PRESSURE: 112 MMHG | BODY MASS INDEX: 32.99 KG/M2 | RESPIRATION RATE: 16 BRPM

## 2024-12-12 DIAGNOSIS — C56.9 MALIGNANT NEOPLASM OF UNSPECIFIED OVARY: ICD-10-CM

## 2024-12-12 LAB
CANCER AG125 SERPL-ACNC: 8 U/ML
CANCER AG19-9 SERPL-ACNC: 2 U/ML

## 2024-12-12 PROCEDURE — 99459 PELVIC EXAMINATION: CPT

## 2024-12-12 PROCEDURE — 99213 OFFICE O/P EST LOW 20 MIN: CPT

## 2025-06-07 LAB
CANCER AG125 SERPL-ACNC: 7 U/ML
CANCER AG19-9 SERPL-ACNC: 2 U/ML

## 2025-06-12 ENCOUNTER — APPOINTMENT (OUTPATIENT)
Dept: GYNECOLOGIC ONCOLOGY | Facility: CLINIC | Age: 55
End: 2025-06-12

## 2025-06-12 VITALS — BODY MASS INDEX: 31.82 KG/M2 | HEIGHT: 65 IN | WEIGHT: 191 LBS

## 2025-06-12 PROCEDURE — 99213 OFFICE O/P EST LOW 20 MIN: CPT

## 2025-06-12 PROCEDURE — 99459 PELVIC EXAMINATION: CPT

## 2025-06-12 PROCEDURE — G2211 COMPLEX E/M VISIT ADD ON: CPT

## 2025-06-16 NOTE — REVIEW OF SYSTEMS
Warm compress for 10-15 minutes. With warm washcloth or Brueder mask or beaded mask. After heat massage eye lid upper and lower. If discharge comes out of glands rinse eyes with eye wash or saline solution. Continue treatment for two weeks. Then 1 to 2 times a week as maintenance.      Continue with Pataday if eyes are itchy.   
[Negative] : Musculoskeletal

## 2025-07-11 ENCOUNTER — RESULT REVIEW (OUTPATIENT)
Age: 55
End: 2025-07-11

## 2025-07-11 ENCOUNTER — APPOINTMENT (OUTPATIENT)
Dept: MAMMOGRAPHY | Facility: CLINIC | Age: 55
End: 2025-07-11
Payer: COMMERCIAL

## 2025-07-11 ENCOUNTER — OUTPATIENT (OUTPATIENT)
Dept: OUTPATIENT SERVICES | Facility: HOSPITAL | Age: 55
LOS: 1 days | End: 2025-07-11
Payer: COMMERCIAL

## 2025-07-11 DIAGNOSIS — Z12.39 ENCOUNTER FOR OTHER SCREENING FOR MALIGNANT NEOPLASM OF BREAST: ICD-10-CM

## 2025-07-11 DIAGNOSIS — Z00.8 ENCOUNTER FOR OTHER GENERAL EXAMINATION: ICD-10-CM

## 2025-07-11 PROCEDURE — 77067 SCR MAMMO BI INCL CAD: CPT

## 2025-07-11 PROCEDURE — 77063 BREAST TOMOSYNTHESIS BI: CPT | Mod: 26

## 2025-07-11 PROCEDURE — 77063 BREAST TOMOSYNTHESIS BI: CPT

## 2025-07-11 PROCEDURE — 77067 SCR MAMMO BI INCL CAD: CPT | Mod: 26

## (undated) DEVICE — STAPLER SKIN PROXIMATE

## (undated) DEVICE — DRAPE MAJOR ABDOMINAL W POUCHES

## (undated) DEVICE — SUT VICRYL 0 27" CT-1

## (undated) DEVICE — ELCTR GROUNDING PAD ADULT COVIDIEN

## (undated) DEVICE — SUT PROLENE 2-0 30" CT-2

## (undated) DEVICE — DRAIN JACKSON PRATT 10MM FLAT FULL 15FR TROCAR

## (undated) DEVICE — PREP CHLORAPREP ORANGE 2PCT 26ML

## (undated) DEVICE — WRAP COMPRESSION CALF MED

## (undated) DEVICE — DRAPE IOBAN 17X23"

## (undated) DEVICE — SUT PDS II 1 48" TP-1

## (undated) DEVICE — PACK MAJOR ABDOMINAL WITH LAP

## (undated) DEVICE — FOLEY TRAY 14FR 5CC LF UMETER CLOSED

## (undated) DEVICE — BLANKET WARMER UPPER ADULT

## (undated) DEVICE — SUT VICRYL 1 27" CT-1

## (undated) DEVICE — DRSG TELFA 3 X 4

## (undated) DEVICE — SUT VICRYL 0 54" REEL UNDYED

## (undated) DEVICE — SUT CHROMIC 2-0 27" SH

## (undated) DEVICE — LIGASURE ATLAS 10MM 20CM

## (undated) DEVICE — DRAPE 3/4 SHEET 52X76"

## (undated) DEVICE — SUCTION YANKAUER TAPERED BULBOUS NO VENT

## (undated) DEVICE — VESSEL LOOP ASPEN SUPERMAXI BLUE

## (undated) DEVICE — DRAIN RESERVOIR FOR JACKSON PRATT 100CC CARDINAL